# Patient Record
Sex: FEMALE | Race: WHITE | NOT HISPANIC OR LATINO | Employment: UNEMPLOYED
[De-identification: names, ages, dates, MRNs, and addresses within clinical notes are randomized per-mention and may not be internally consistent; named-entity substitution may affect disease eponyms.]

---

## 2019-11-05 ENCOUNTER — HEALTH MAINTENANCE LETTER (OUTPATIENT)
Age: 44
End: 2019-11-05

## 2020-11-22 ENCOUNTER — HEALTH MAINTENANCE LETTER (OUTPATIENT)
Age: 45
End: 2020-11-22

## 2021-09-19 ENCOUNTER — HEALTH MAINTENANCE LETTER (OUTPATIENT)
Age: 46
End: 2021-09-19

## 2022-01-09 ENCOUNTER — HEALTH MAINTENANCE LETTER (OUTPATIENT)
Age: 47
End: 2022-01-09

## 2022-05-23 ENCOUNTER — LAB (OUTPATIENT)
Dept: LAB | Facility: CLINIC | Age: 47
End: 2022-05-23

## 2022-05-23 ENCOUNTER — TELEPHONE (OUTPATIENT)
Dept: PEDIATRICS | Facility: CLINIC | Age: 47
End: 2022-05-23

## 2022-05-23 ENCOUNTER — OFFICE VISIT (OUTPATIENT)
Dept: PEDIATRICS | Facility: CLINIC | Age: 47
End: 2022-05-23
Payer: COMMERCIAL

## 2022-05-23 VITALS
SYSTOLIC BLOOD PRESSURE: 132 MMHG | TEMPERATURE: 98.2 F | OXYGEN SATURATION: 95 % | DIASTOLIC BLOOD PRESSURE: 72 MMHG | RESPIRATION RATE: 16 BRPM | BODY MASS INDEX: 38.32 KG/M2 | WEIGHT: 258.7 LBS | HEART RATE: 80 BPM | HEIGHT: 69 IN

## 2022-05-23 DIAGNOSIS — Z78.9 MALE-TO-FEMALE TRANSGENDER PERSON: Primary | ICD-10-CM

## 2022-05-23 DIAGNOSIS — Z12.11 COLON CANCER SCREENING: ICD-10-CM

## 2022-05-23 DIAGNOSIS — Z11.3 SCREEN FOR STD (SEXUALLY TRANSMITTED DISEASE): ICD-10-CM

## 2022-05-23 LAB
HCV AB SERPL QL IA: NONREACTIVE
HIV 1+2 AB+HIV1 P24 AG SERPL QL IA: NONREACTIVE
T PALLIDUM AB SER QL: NONREACTIVE

## 2022-05-23 PROCEDURE — 36415 COLL VENOUS BLD VENIPUNCTURE: CPT | Performed by: NURSE PRACTITIONER

## 2022-05-23 PROCEDURE — 99203 OFFICE O/P NEW LOW 30 MIN: CPT | Mod: 25 | Performed by: NURSE PRACTITIONER

## 2022-05-23 PROCEDURE — 90471 IMMUNIZATION ADMIN: CPT | Performed by: NURSE PRACTITIONER

## 2022-05-23 PROCEDURE — 90715 TDAP VACCINE 7 YRS/> IM: CPT | Performed by: NURSE PRACTITIONER

## 2022-05-23 PROCEDURE — 87389 HIV-1 AG W/HIV-1&-2 AB AG IA: CPT | Performed by: NURSE PRACTITIONER

## 2022-05-23 PROCEDURE — 86803 HEPATITIS C AB TEST: CPT | Performed by: NURSE PRACTITIONER

## 2022-05-23 PROCEDURE — 87491 CHLMYD TRACH DNA AMP PROBE: CPT | Mod: 59 | Performed by: NURSE PRACTITIONER

## 2022-05-23 PROCEDURE — 87491 CHLMYD TRACH DNA AMP PROBE: CPT

## 2022-05-23 PROCEDURE — 87591 N.GONORRHOEAE DNA AMP PROB: CPT

## 2022-05-23 PROCEDURE — 87591 N.GONORRHOEAE DNA AMP PROB: CPT | Mod: 59 | Performed by: NURSE PRACTITIONER

## 2022-05-23 PROCEDURE — 86780 TREPONEMA PALLIDUM: CPT | Performed by: NURSE PRACTITIONER

## 2022-05-23 ASSESSMENT — PAIN SCALES - GENERAL: PAINLEVEL: NO PAIN (0)

## 2022-05-23 NOTE — PROGRESS NOTES
HPI       Pt is a Male to Female individual that goes by Phoenix.  Pt is a 47 year old  that presents today to establish care with me. Came to this clinic via [facility].     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    to cis-woman Davy. Has transmasculine kid, came out in high school and is now age 23.  Is in polyamorous relationship. Davy's partner: serena.     Is not on hormones.    Patient came out on very recently. is out at work. They note their supports include wife and partners. Also came out to mother, an ex-nun and it went ok.     No formal history of depression or anxiety, no history of suicide attempt.   No flowsheet data found.  No flowsheet data found.    Goals of hormonal therapy include more femininity, bottom surgery.  Future goals of surgical intervention include unsure, bottom?  Future fertility plans include: likely not interested in genetic kids    is sexually active. Has primary partner wife, ciswoman, is in polyamourous relationships, doesn't use condoms 100%, bottoms with partners with penis. Currently feels safe in relationships.     History reviewed. No pertinent surgical history.    Patient Active Problem List   Diagnosis     Male-to-female transgender person       No current outpatient medications on file.       Family History   Problem Relation Age of Onset     Diabetes Mother      Cancer Father 76        brain     Diabetes Father           Allergies   Allergen Reactions     Zithromax [Azithromycin Dihydrate] Hives                Review of Systems:     Review of Systems:  CONSTITUTIONAL: NEGATIVE for fever, chills, change in weight  INTEGUMENTARY/SKIN: NEGATIVE for worrisome rashes, moles or lesions  EYES: NEGATIVE for vision changes or irritation  ENT/MOUTH: NEGATIVE for ear, mouth and throat problems  RESP: NEGATIVE for significant cough or SOB  BREAST: NEGATIVE for masses, tenderness or discharge  CV: NEGATIVE for chest pain, palpitations or peripheral edema  GI: NEGATIVE for  "nausea, abdominal pain, heartburn, or change in bowel habits  : NEGATIVE for frequency, dysuria, or hematuria  MUSCULOSKELETAL: NEGATIVE for significant arthralgias or myalgia  NEURO: NEGATIVE for weakness, dizziness or paresthesias  ENDOCRINE: NEGATIVE for temperature intolerance, skin/hair changes  HEME/ALLERGY: NEGATIVE for bleeding problems  PSYCHIATRIC: NEGATIVE for changes in mood or affect         Physical Exam:     Vitals:    05/23/22 0806   BP: 132/72   BP Location: Right arm   Cuff Size: Adult Large   Pulse: 80   Resp: 16   Temp: 98.2  F (36.8  C)   TempSrc: Tympanic   SpO2: 95%   Weight: 117.3 kg (258 lb 11.2 oz)   Height: 1.746 m (5' 8.75\")     BMI= Body mass index is 38.48 kg/m .       Affect: Appropriate/mood-congruent           Labs:     No results found for any previous visit.       No results found for: A1C  No results found for: LDL  No results found for: HGB]    Assessment and Plan      Dixon was seen today for establish care.    Diagnoses and all orders for this visit:    Male-to-female transgender person    Screen for STD (sexually transmitted disease)  -     HIV Antigen Antibody Combo; Future  -     Hepatitis C Screen Reflex to HCV RNA Quant and Genotype; Future  -     Cancel: NEISSERIA GONORRHOEA PCR  -     Cancel: CHLAMYDIA TRACHOMATIS PCR  -     NEISSERIA GONORRHOEA PCR  -     CHLAMYDIA TRACHOMATIS PCR  -     Treponema Abs w Reflex to RPR and Titer; Future  -     HIV Antigen Antibody Combo  -     Hepatitis C Screen Reflex to HCV RNA Quant and Genotype  -     Treponema Abs w Reflex to RPR and Titer  -     Chlamydia trachomatis PCR; Future  -     Neisseria gonorrhoeae PCR; Future    Colon cancer screening  -     COLOGUARD(EXACT SCIENCES)    Other orders  -     REVIEW OF HEALTH MAINTENANCE PROTOCOL ORDERS  -     TDAP VACCINE (Adacel, Boostrix)        Oriented to clinic, my schedule, clinic processes. Encourage use of mychart for lab results. Oriented to overall hormone start process, may be " months before hormones start, expectation to continue in therapy during first months to years of hormone start, need for [id number] visits then q3mo, then q6mo.    We did completed a thorough medical and social history review today, briefly mentioned gender affirming care, but did not do informed consent letter. She does need primary care including labs, cancer prevention discussion, prevent visit. Her priority is to start estrogen. Will schedule vrt visit as soon as feasible to discuss infomred consent document.       There are no discontinued medications.  Questions were elicited and answered.     Shannon Arguello, JOHANNA CNP

## 2022-05-23 NOTE — PATIENT INSTRUCTIONS
At next visit we'll talk about gender affirmation medications, surgeries, sleep, cholesterol and heart disease prevention, cancer screening.     Risks of Feminizing Hormones (MtF)     Increased risks:  - Venous thromboembolic disease: especially with age above 40, smokers, sedentary lifestyle, and/or thromboembolic disorders  - Cardiovascular and cerebrovascular disease: increases above age 50, may be increased with progestins  - Lipids: Increase triglycerides increasing risk of pancreatitis and cardiovascular events  - Liver: elevations of liver enzymes have been associated with estrogens, rarely hepatotoxicity. Estrogens may also increase the risk of gallstones  - Type 2 diabetes, hypertension, prolactinoma (tumor in the brain) may also be increased risk with hormone therapy.    Unclear risks:  -Fertility: recommended to store sperm prior to starting estrogen therapy to preserve fertilty  -Breast cancer: unclear risk of breast cancer    Sexual health: Hormone therapy may decrease libdo, impair fertility, and impact sexual function.

## 2022-05-23 NOTE — TELEPHONE ENCOUNTER
Lab called station and reported wrong swab was used for rectal sample. Need patient to return to redo sample.    Called and left VM that lab had a problem with sample. Need patient to return to clinic for lab appointment. Please get patient in asap for lab appointment. Ok to take same day.    Scarlett Bonilla, CMA

## 2022-05-23 NOTE — TELEPHONE ENCOUNTER
When the pt calls back we will schedule a lab appt as soon as possible.   Shobha Dozier on 5/23/2022 at 11:07 AM

## 2022-05-24 LAB
C TRACH DNA SPEC QL NAA+PROBE: NEGATIVE
N GONORRHOEA DNA SPEC QL NAA+PROBE: NEGATIVE

## 2022-05-24 NOTE — TELEPHONE ENCOUNTER
"Pt called back (called PAL).    Pt already left a new sample last night. Sample is currently in process.      - Ashvin \"Vishal\" Bethany (he/him/his), RN - Patient Advocate Liason (PAL)  [facility]    "

## 2022-05-25 LAB
C TRACH DNA SPEC QL NAA+PROBE: NEGATIVE
N GONORRHOEA DNA SPEC QL NAA+PROBE: NEGATIVE

## 2022-06-10 LAB — NONINV COLON CA DNA+OCC BLD SCRN STL QL: NEGATIVE

## 2022-06-15 NOTE — ASSESSMENT & PLAN NOTE
Reviewed estrogen and nasrin, will start prescription and do baseline labs today. follow up in 3 month for labs and will est with therapist

## 2022-06-15 NOTE — PROGRESS NOTES
Assessment/Plan  Problem List Items Addressed This Visit     Male-to-female transgender person - Primary     Reviewed estrogen and nasrin, will start prescription and do baseline labs today. follow up in 3 month for labs and will est with therapist           Relevant Medications    estradiol (ESTRACE) 2 MG tablet    spironolactone (ALDACTONE) 25 MG tablet    Other Relevant Orders    Testosterone, total    Comprehensive metabolic panel (BMP + Alb, Alk Phos, ALT, AST, Total. Bili, TP)    Lipid panel reflex to direct LDL Fasting    CBC with platelets and differential    Lipid panel reflex to direct LDL Fasting    Comprehensive metabolic panel (BMP + Alb, Alk Phos, ALT, AST, Total. Bili, TP)    Testosterone total          No results found for any visits on 06/17/22.    Health Maintenance Due   Topic Date Due     PREVENTIVE CARE VISIT  Never done     ADVANCE CARE PLANNING  Never done     LIPID  Never done     Connected via [employer]My[employer] visit  Time in: 8:20 am    Time out: 9 am    Subjective  Patient is ready to start a prescription for gender affirming hormones during this visit. Informed consent was not yet signed.       Review of Systems    History  No past medical history on file.    No past surgical history on file.    Family History   Problem Relation Age of Onset     Diabetes Mother      Cancer Father 76        brain     Diabetes Father        Social History     Tobacco Use     Smoking status: Never Smoker     Smokeless tobacco: Never Used   Substance Use Topics     Alcohol use: Yes     Comment: 1 beer every 2 weeks        Objective  There were no vitals taken for this visit.  Vitals taken by JOHANNA Espinoza CNP    This visit was a video visit.   Physical Exam   GENERAL: Healthy, alert and no distress  EYES: Eyes grossly normal to inspection.  No discharge or erythema, or obvious scleral/conjunctival abnormalities.  RESP: No audible wheeze, cough, or visible cyanosis.  No visible retractions or increased  work of breathing.    SKIN: Visible skin clear. No significant rash, abnormal pigmentation or lesions.  NEURO: Cranial nerves grossly intact.  Mentation and speech appropriate for age.  PSYCH: Mentation appears normal, affect normal/bright, judgement and insight intact, normal speech and appearance well-groomed.     Return in about 3 months (around 9/17/2022).        JOHANNA Espinoza CNP  [facility]

## 2022-06-17 ENCOUNTER — LAB (OUTPATIENT)
Dept: LAB | Facility: CLINIC | Age: 47
End: 2022-06-17

## 2022-06-17 ENCOUNTER — VIRTUAL VISIT (OUTPATIENT)
Dept: PEDIATRICS | Facility: CLINIC | Age: 47
End: 2022-06-17
Payer: COMMERCIAL

## 2022-06-17 ENCOUNTER — PATIENT OUTREACH (OUTPATIENT)
Dept: PEDIATRICS | Facility: CLINIC | Age: 47
End: 2022-06-17

## 2022-06-17 VITALS
WEIGHT: 256 LBS | BODY MASS INDEX: 38.08 KG/M2 | SYSTOLIC BLOOD PRESSURE: 122 MMHG | HEART RATE: 54 BPM | DIASTOLIC BLOOD PRESSURE: 78 MMHG

## 2022-06-17 DIAGNOSIS — Z78.9 MALE-TO-FEMALE TRANSGENDER PERSON: Primary | ICD-10-CM

## 2022-06-17 DIAGNOSIS — Z78.9 MALE-TO-FEMALE TRANSGENDER PERSON: ICD-10-CM

## 2022-06-17 LAB
ALBUMIN SERPL-MCNC: 4.1 G/DL (ref 3.4–5)
ALP SERPL-CCNC: 104 U/L (ref 40–150)
ALT SERPL W P-5'-P-CCNC: 29 U/L (ref 0–70)
ANION GAP SERPL CALCULATED.3IONS-SCNC: 4 MMOL/L (ref 3–14)
AST SERPL W P-5'-P-CCNC: 21 U/L (ref 0–45)
BASOPHILS # BLD AUTO: 0.1 10E3/UL (ref 0–0.2)
BASOPHILS NFR BLD AUTO: 1 %
BILIRUB SERPL-MCNC: 0.6 MG/DL (ref 0.2–1.3)
BUN SERPL-MCNC: 11 MG/DL (ref 7–30)
CALCIUM SERPL-MCNC: 8.9 MG/DL (ref 8.5–10.1)
CHLORIDE BLD-SCNC: 110 MMOL/L (ref 94–109)
CHOLEST SERPL-MCNC: 203 MG/DL
CO2 SERPL-SCNC: 26 MMOL/L (ref 20–32)
CREAT SERPL-MCNC: 0.79 MG/DL (ref 0.52–1.25)
EOSINOPHIL # BLD AUTO: 0.2 10E3/UL (ref 0–0.7)
EOSINOPHIL NFR BLD AUTO: 3 %
ERYTHROCYTE [DISTWIDTH] IN BLOOD BY AUTOMATED COUNT: 13.1 % (ref 10–15)
FASTING STATUS PATIENT QL REPORTED: YES
GFR SERPL CREATININE-BSD FRML MDRD: >90 ML/MIN/1.73M2
GLUCOSE BLD-MCNC: 100 MG/DL (ref 70–99)
HCT VFR BLD AUTO: 47.4 % (ref 35–53)
HDLC SERPL-MCNC: 39 MG/DL
HGB BLD-MCNC: 16 G/DL (ref 11.7–17.7)
LDLC SERPL CALC-MCNC: 139 MG/DL
LYMPHOCYTES # BLD AUTO: 2 10E3/UL (ref 0.8–5.3)
LYMPHOCYTES NFR BLD AUTO: 26 %
MCH RBC QN AUTO: 29.5 PG (ref 26.5–33)
MCHC RBC AUTO-ENTMCNC: 33.8 G/DL (ref 31.5–36.5)
MCV RBC AUTO: 88 FL (ref 78–100)
MONOCYTES # BLD AUTO: 0.9 10E3/UL (ref 0–1.3)
MONOCYTES NFR BLD AUTO: 11 %
NEUTROPHILS # BLD AUTO: 4.7 10E3/UL (ref 1.6–8.3)
NEUTROPHILS NFR BLD AUTO: 60 %
NONHDLC SERPL-MCNC: 164 MG/DL
PLATELET # BLD AUTO: 321 10E3/UL (ref 150–450)
POTASSIUM BLD-SCNC: 3.8 MMOL/L (ref 3.4–5.3)
PROT SERPL-MCNC: 7.3 G/DL (ref 6.8–8.8)
RBC # BLD AUTO: 5.42 10E6/UL (ref 3.8–5.9)
SODIUM SERPL-SCNC: 140 MMOL/L (ref 133–144)
TRIGL SERPL-MCNC: 123 MG/DL
WBC # BLD AUTO: 7.9 10E3/UL (ref 4–11)

## 2022-06-17 PROCEDURE — 80053 COMPREHEN METABOLIC PANEL: CPT

## 2022-06-17 PROCEDURE — 36415 COLL VENOUS BLD VENIPUNCTURE: CPT

## 2022-06-17 PROCEDURE — 85025 COMPLETE CBC W/AUTO DIFF WBC: CPT

## 2022-06-17 PROCEDURE — 84403 ASSAY OF TOTAL TESTOSTERONE: CPT

## 2022-06-17 PROCEDURE — 80061 LIPID PANEL: CPT

## 2022-06-17 PROCEDURE — 99214 OFFICE O/P EST MOD 30 MIN: CPT | Mod: 95 | Performed by: NURSE PRACTITIONER

## 2022-06-17 RX ORDER — SPIRONOLACTONE 25 MG/1
25 TABLET ORAL 2 TIMES DAILY
Qty: 180 TABLET | Refills: 3 | Status: SHIPPED | OUTPATIENT
Start: 2022-06-17 | End: 2023-03-10

## 2022-06-17 RX ORDER — ESTRADIOL 2 MG/1
2 TABLET ORAL 2 TIMES DAILY
Qty: 180 TABLET | Refills: 0 | Status: SHIPPED | OUTPATIENT
Start: 2022-06-17 | End: 2022-09-06

## 2022-06-17 NOTE — Clinical Note
Print an send informed consent letter to patient. Please keep an eye on getting a signed copy back. Once back, ping me to send in prescription for GAHT.  Schedule lab only appointment fasting as soon as feasible (today is fine). If she comes in today, can you please give her the informed consent letter for her to sign and leave with you? Schedule lab only fasting in 3 month AND vitals nurse only, then with me the following wk VRT ok.  ALSO help connect with gender queer therapist AND support group for family (if you know of any!).

## 2022-06-17 NOTE — PATIENT INSTRUCTIONS
Schedule lab only as soon as feasible.     We'll start spironolactone 25 mg TWICE daily. Along with estrace 2 mg TWICE daily.     Vishal will be in contact with you about establishing care with a therapist.     [email or web address]

## 2022-06-17 NOTE — TELEPHONE ENCOUNTER
"Shannon Laboy: Pt was seen in the clinic for labs and signed consents with me, along with collecting BP and wt.    Scheduled follow-up appts.    Also, introduced myself.      - Ashvin \"Vishal\" Bethany (he/him/his), RN - Patient Advocate Liason (PAL)  [facility]    "

## 2022-06-17 NOTE — LETTER
[facility]  [address] [place] [id number]  [phone]      June 17, 2022    Phoenix BRYANNA Parsonsceleste                                                                                                                                               [facility] [place] [id number]      Informed Consent for Feminizing Therapy    Your initials indicate an understanding and discussion regarding the feminizing effects of medications. If you have questions or concerns, please ask for clarification before initialing.   Androgen (testosterone) blockers are used to decrease the amount and/or block the effect of testosterone and reduce the male features of the body.   Estrogen (usually estradiol) is used to feminize the body. Estrogens can also decrease the amount and effect of testosterone. Your provider will determine the form of estrogen and the dose that is best for you based on your personal needs, considering your medical history. If you have any questions or concerns, please ask for clarification before initialing.     ____I have been informed that the feminizing effects of estrogen therapy may take up to several months to become noticeable and more than 5 years to become complete. Some changes may be permanent including breast growth and development, testicles shrinking in size, decreased sperm production and decreased libido.  Other changes include loss of muscle mass, weight gain, fat redistribution, softer skin, softening of facial and body hair, decreased strength of erections and changes in mood.    ____I understand that there is a potential loss of fertility.  Even after stopping hormone therapy the ability to make viable sperm may not come back.  I have been advised to undergo sperm banking if this is a concern of mine.    ____ I understand that if I have penetrative sex with a person with a vagina, I can potentially cause pregnancy and should consider birth control if not  the intent.    ____ I understand that there is an increased risk of developing blood clots. Symptoms of blood clots were reviewed with me today. I acknowledge that smoking and vaping as well as hypertension, high cholesterol and diabetes also increases clot risks and can inflate this risk when on estrogen hormones.     ____ I understand that the effects of estrogens may lead to liver inflammation and damage. I will have routine blood work to screen for these issues.     ____ I understand that the effects of estrogens can increase blood pressure and I will have routine care to monitor as indicated by the provider.     ____I understand the effects of estrogens can increase headaches and migraines.  If I have migraines with aura, this may significantly increase risk of stroke and these risks have been reviewed by the provider.    ____I understand that I may need to stop taking hormones a few weeks before and after any surgery.    ____I understand that if I should have an orchiectomy this may change the dosing of my medications and I will review this with my prescriber.    ____I agree to tell my provider if I should be taking any other medications, dietary supplements, herbs and/or recreational drugs.  The supplements can interfere with my estrogen therapy and increase risks.    ____I understand that every body is different and that there is no way to predict what my response to hormones will be.  I also understand that the right dosage for me may not be the same as for someone else.  I further understand that I will follow the prescribed regime of medications as indicated by my provider.     ____I will have complete physical exams and lab tests as indicated by my provider.  I understand this is a requirement to continue my therapy.      By signing this form I acknowledge that I have adequate information and knowledge to be able to make a decision about hormone therapy and that I understand the information my medical  provider has given me.      ________________________________________  Patient signature and date      ________________________________________  Provider signature and date

## 2022-06-17 NOTE — TELEPHONE ENCOUNTER
"Outreach to pt:  1) Print Informed Consent form for HRT. Await for signed form. Then route to PCP to sign appropriate medications.    2) Schedule a lab appt ASAP. Fasting.    3) Schedule a lab appt 3 months from now, followed by nurse-only appt for vitals, and VRT appt with Shannon.    4) Help pt find gender-queer therapist.    5) If able, recommend support group/resource for family.        - Ashvin \"Vishal\" Bethany (he/him/his), RN - Patient Advocate Liason (PAL)  [facility]    "

## 2022-06-20 ENCOUNTER — TELEPHONE (OUTPATIENT)
Dept: PEDIATRICS | Facility: CLINIC | Age: 47
End: 2022-06-20

## 2022-06-20 NOTE — TELEPHONE ENCOUNTER
Prior Authorization Retail Medication Request    Medication/Dose: estradiol (ESTRACE) 2 MG tablet  ICD code (if different than what is on RX):  Male-to-female transgender person [Z78.9]  - Primary   Previously Tried and Failed: NA  Rationale: NA    Insurance Name: [employer]  Insurance ID: [id number]     Pharmacy Information (if different than what is on RX)  Name: [place], [place]  Phone: [phone]

## 2022-06-21 LAB — TESTOST SERPL-MCNC: 282 NG/DL (ref 8–950)

## 2022-06-22 NOTE — TELEPHONE ENCOUNTER
Central Prior Authorization Team  Phone: [phone]      PA Initiation    Medication: Prior Authorization; estradiol (ESTRACE) 2 MG tablet-INITIATED   Insurance Company:    Pharmacy Filling the Rx: [facility] - [place], [place] - [address]  Filling Pharmacy Phone: [phone]  Filling Pharmacy Fax:    Start Date: 6/22/2022

## 2022-06-22 NOTE — TELEPHONE ENCOUNTER
Central Prior Authorization Team  Phone: [phone]    Prior Authorization Approval    Authorization Effective Date:    Authorization Expiration Date:    Medication: Prior Authorization; estradiol (ESTRACE) 2 MG tablet-APPROVED   Approved Dose/Quantity: 2MG/  Reference #:     Insurance Company:    Expected CoPay:       CoPay Card Available:      Foundation Assistance Needed:    Which Pharmacy is filling the prescription (Not needed for infusion/clinic administered): [facility] - [place], [place] - [address]  Pharmacy Notified:  YES  Patient Notified:  YES

## 2022-08-30 ENCOUNTER — ALLIED HEALTH/NURSE VISIT (OUTPATIENT)
Dept: PEDIATRICS | Facility: CLINIC | Age: 47
End: 2022-08-30

## 2022-08-30 ENCOUNTER — LAB (OUTPATIENT)
Dept: LAB | Facility: CLINIC | Age: 47
End: 2022-08-30
Payer: COMMERCIAL

## 2022-08-30 VITALS
HEART RATE: 56 BPM | WEIGHT: 237.9 LBS | TEMPERATURE: 96.8 F | BODY MASS INDEX: 35.39 KG/M2 | OXYGEN SATURATION: 98 % | SYSTOLIC BLOOD PRESSURE: 118 MMHG | DIASTOLIC BLOOD PRESSURE: 70 MMHG

## 2022-08-30 DIAGNOSIS — Z78.9 MALE-TO-FEMALE TRANSGENDER PERSON: ICD-10-CM

## 2022-08-30 DIAGNOSIS — Z01.30 BP CHECK: Primary | ICD-10-CM

## 2022-08-30 LAB
ALBUMIN SERPL-MCNC: 3.7 G/DL (ref 3.4–5)
ALP SERPL-CCNC: 82 U/L (ref 40–150)
ALT SERPL W P-5'-P-CCNC: 21 U/L (ref 0–70)
ANION GAP SERPL CALCULATED.3IONS-SCNC: 9 MMOL/L (ref 3–14)
AST SERPL W P-5'-P-CCNC: 15 U/L (ref 0–45)
BILIRUB SERPL-MCNC: 0.4 MG/DL (ref 0.2–1.3)
BUN SERPL-MCNC: 14 MG/DL (ref 7–30)
CALCIUM SERPL-MCNC: 9 MG/DL (ref 8.5–10.1)
CHLORIDE BLD-SCNC: 108 MMOL/L (ref 94–109)
CHOLEST SERPL-MCNC: 181 MG/DL
CO2 SERPL-SCNC: 20 MMOL/L (ref 20–32)
CREAT SERPL-MCNC: 0.76 MG/DL (ref 0.52–1.25)
FASTING STATUS PATIENT QL REPORTED: YES
GFR SERPL CREATININE-BSD FRML MDRD: >90 ML/MIN/1.73M2
GLUCOSE BLD-MCNC: 102 MG/DL (ref 70–99)
HDLC SERPL-MCNC: 38 MG/DL
LDLC SERPL CALC-MCNC: 108 MG/DL
NONHDLC SERPL-MCNC: 143 MG/DL
POTASSIUM BLD-SCNC: 4.2 MMOL/L (ref 3.4–5.3)
PROT SERPL-MCNC: 6.9 G/DL (ref 6.8–8.8)
SODIUM SERPL-SCNC: 137 MMOL/L (ref 133–144)
TRIGL SERPL-MCNC: 176 MG/DL

## 2022-08-30 PROCEDURE — 80061 LIPID PANEL: CPT

## 2022-08-30 PROCEDURE — 99207 PR NO CHARGE NURSE ONLY: CPT

## 2022-08-30 PROCEDURE — 80053 COMPREHEN METABOLIC PANEL: CPT

## 2022-08-30 PROCEDURE — 84403 ASSAY OF TOTAL TESTOSTERONE: CPT

## 2022-08-30 PROCEDURE — 36415 COLL VENOUS BLD VENIPUNCTURE: CPT

## 2022-08-30 SDOH — ECONOMIC STABILITY: TRANSPORTATION INSECURITY
IN THE PAST 12 MONTHS, HAS LACK OF TRANSPORTATION KEPT YOU FROM MEETINGS, WORK, OR FROM GETTING THINGS NEEDED FOR DAILY LIVING?: NO

## 2022-08-30 SDOH — HEALTH STABILITY: PHYSICAL HEALTH: ON AVERAGE, HOW MANY MINUTES DO YOU ENGAGE IN EXERCISE AT THIS LEVEL?: 120 MIN

## 2022-08-30 SDOH — ECONOMIC STABILITY: FOOD INSECURITY: WITHIN THE PAST 12 MONTHS, THE FOOD YOU BOUGHT JUST DIDN'T LAST AND YOU DIDN'T HAVE MONEY TO GET MORE.: NEVER TRUE

## 2022-08-30 SDOH — ECONOMIC STABILITY: FOOD INSECURITY: WITHIN THE PAST 12 MONTHS, YOU WORRIED THAT YOUR FOOD WOULD RUN OUT BEFORE YOU GOT MONEY TO BUY MORE.: NEVER TRUE

## 2022-08-30 SDOH — ECONOMIC STABILITY: INCOME INSECURITY: HOW HARD IS IT FOR YOU TO PAY FOR THE VERY BASICS LIKE FOOD, HOUSING, MEDICAL CARE, AND HEATING?: NOT VERY HARD

## 2022-08-30 SDOH — HEALTH STABILITY: PHYSICAL HEALTH: ON AVERAGE, HOW MANY DAYS PER WEEK DO YOU ENGAGE IN MODERATE TO STRENUOUS EXERCISE (LIKE A BRISK WALK)?: 7 DAYS

## 2022-08-30 SDOH — ECONOMIC STABILITY: INCOME INSECURITY: IN THE LAST 12 MONTHS, WAS THERE A TIME WHEN YOU WERE NOT ABLE TO PAY THE MORTGAGE OR RENT ON TIME?: NO

## 2022-08-30 SDOH — ECONOMIC STABILITY: TRANSPORTATION INSECURITY
IN THE PAST 12 MONTHS, HAS THE LACK OF TRANSPORTATION KEPT YOU FROM MEDICAL APPOINTMENTS OR FROM GETTING MEDICATIONS?: NO

## 2022-08-30 ASSESSMENT — LIFESTYLE VARIABLES
AUDIT-C TOTAL SCORE: 1
HOW OFTEN DO YOU HAVE SIX OR MORE DRINKS ON ONE OCCASION: NEVER
HOW OFTEN DO YOU HAVE A DRINK CONTAINING ALCOHOL: MONTHLY OR LESS
SKIP TO QUESTIONS 9-10: 1
HOW MANY STANDARD DRINKS CONTAINING ALCOHOL DO YOU HAVE ON A TYPICAL DAY: 1 OR 2

## 2022-08-30 ASSESSMENT — ENCOUNTER SYMPTOMS
PALPITATIONS: 0
CONSTIPATION: 0
WEAKNESS: 0
MYALGIAS: 0
PARESTHESIAS: 0
HEMATURIA: 0
DIZZINESS: 0
SORE THROAT: 0
FEVER: 0
BREAST MASS: 0
ABDOMINAL PAIN: 0
COUGH: 0
NERVOUS/ANXIOUS: 0
DIARRHEA: 0
ARTHRALGIAS: 0
HEMATOCHEZIA: 0
HEARTBURN: 0
FREQUENCY: 0
SHORTNESS OF BREATH: 0
NAUSEA: 0
HEADACHES: 0
JOINT SWELLING: 0
DYSURIA: 0
EYE PAIN: 0
CHILLS: 0

## 2022-08-30 ASSESSMENT — SOCIAL DETERMINANTS OF HEALTH (SDOH)
IN A TYPICAL WEEK, HOW MANY TIMES DO YOU TALK ON THE PHONE WITH FAMILY, FRIENDS, OR NEIGHBORS?: TWICE A WEEK
HOW OFTEN DO YOU ATTEND CHURCH OR RELIGIOUS SERVICES?: NEVER
DO YOU BELONG TO ANY CLUBS OR ORGANIZATIONS SUCH AS CHURCH GROUPS UNIONS, FRATERNAL OR ATHLETIC GROUPS, OR SCHOOL GROUPS?: YES
HOW OFTEN DO YOU GET TOGETHER WITH FRIENDS OR RELATIVES?: TWICE A WEEK

## 2022-08-30 NOTE — PROGRESS NOTES
Dixon Corrales is a 47 year old patient who comes in today for a Blood Pressure check.  Initial BP:  /70   Pulse 56   Temp 96.8  F (36  C) (Temporal)   Wt 107.9 kg (237 lb 14.4 oz)   SpO2 98%   BMI 35.39 kg/m         Disposition: provider notified while patient in the clinic      GEM Townsend

## 2022-09-02 LAB — TESTOST SERPL-MCNC: 38 NG/DL (ref 8–950)

## 2022-09-06 ENCOUNTER — OFFICE VISIT (OUTPATIENT)
Dept: PEDIATRICS | Facility: CLINIC | Age: 47
End: 2022-09-06
Payer: COMMERCIAL

## 2022-09-06 VITALS
HEART RATE: 57 BPM | RESPIRATION RATE: 18 BRPM | WEIGHT: 240.7 LBS | TEMPERATURE: 97.3 F | HEIGHT: 69 IN | BODY MASS INDEX: 35.65 KG/M2 | OXYGEN SATURATION: 99 %

## 2022-09-06 DIAGNOSIS — E66.09 CLASS 2 OBESITY DUE TO EXCESS CALORIES WITH BODY MASS INDEX (BMI) OF 35.0 TO 35.9 IN ADULT, UNSPECIFIED WHETHER SERIOUS COMORBIDITY PRESENT: ICD-10-CM

## 2022-09-06 DIAGNOSIS — Z00.00 ROUTINE GENERAL MEDICAL EXAMINATION AT A HEALTH CARE FACILITY: Primary | ICD-10-CM

## 2022-09-06 DIAGNOSIS — Z78.9 MALE-TO-FEMALE TRANSGENDER PERSON: ICD-10-CM

## 2022-09-06 DIAGNOSIS — E66.812 CLASS 2 OBESITY DUE TO EXCESS CALORIES WITH BODY MASS INDEX (BMI) OF 35.0 TO 35.9 IN ADULT, UNSPECIFIED WHETHER SERIOUS COMORBIDITY PRESENT: ICD-10-CM

## 2022-09-06 DIAGNOSIS — D22.9 ATYPICAL MOLE: ICD-10-CM

## 2022-09-06 PROCEDURE — 90686 IIV4 VACC NO PRSV 0.5 ML IM: CPT | Performed by: NURSE PRACTITIONER

## 2022-09-06 PROCEDURE — 99213 OFFICE O/P EST LOW 20 MIN: CPT | Mod: 25 | Performed by: NURSE PRACTITIONER

## 2022-09-06 PROCEDURE — 99396 PREV VISIT EST AGE 40-64: CPT | Mod: 25 | Performed by: NURSE PRACTITIONER

## 2022-09-06 PROCEDURE — 90471 IMMUNIZATION ADMIN: CPT | Performed by: NURSE PRACTITIONER

## 2022-09-06 RX ORDER — ESTRADIOL 2 MG/1
2 TABLET ORAL 2 TIMES DAILY
Qty: 180 TABLET | Refills: 0 | Status: SHIPPED | OUTPATIENT
Start: 2022-09-06 | End: 2022-12-09

## 2022-09-06 ASSESSMENT — ENCOUNTER SYMPTOMS
DIZZINESS: 0
FEVER: 0
DYSURIA: 0
PALPITATIONS: 0
HEADACHES: 0
ABDOMINAL PAIN: 0
WEAKNESS: 0
HEMATURIA: 0
ARTHRALGIAS: 0
COUGH: 0
NERVOUS/ANXIOUS: 0
SHORTNESS OF BREATH: 0
FREQUENCY: 0
MYALGIAS: 0
JOINT SWELLING: 0
NAUSEA: 0
CHILLS: 0
CONSTIPATION: 0
SORE THROAT: 0
DIARRHEA: 0
EYE PAIN: 0

## 2022-09-06 ASSESSMENT — PAIN SCALES - GENERAL: PAINLEVEL: NO PAIN (0)

## 2022-09-06 NOTE — Clinical Note
I referred for hair removal of face - I want to be sure this gets done, because the referal looked sketchy.... she also has some benign appearing moles of face that sh'ed like removed along with laser hair removal.

## 2022-09-06 NOTE — PROGRESS NOTES
SUBJECTIVE:   CC: PhoenixS Stremski is an 47 year old who presents for preventative health visit.       Patient has been advised of split billing requirements and indicates understanding: Yes  Healthy Habits:     Getting at least 3 servings of Calcium per day:  Yes    Bi-annual eye exam:  Yes    Dental care twice a year:  Yes    Sleep apnea or symptoms of sleep apnea:  None    Diet:  Regular (no restrictions)    Frequency of exercise:  6-7 days/week    Duration of exercise:  Greater than 60 minutes    Taking medications regularly:  Yes    Medication side effects:  None    PHQ-2 Total Score: 0    Has been walking more, exercising. Trying to eat healtier.     Wt Readings from Last 4 Encounters:   09/06/22 109.2 kg (240 lb 11.2 oz)   08/30/22 107.9 kg (237 lb 14.4 oz)   06/17/22 116.1 kg (256 lb)   05/23/22 117.3 kg (258 lb 11.2 oz)     KRYSTEN, started in June. taking regularly, notes feeling a little more emotional, sensitive top. No skipped or missed dose. No bad se or nausea.       Today's PHQ-2 Score:   PHQ-2 ( 1999 Pfizer) 8/30/2022   Q1: Little interest or pleasure in doing things 0   Q2: Feeling down, depressed or hopeless 0   PHQ-2 Score 0   Q1: Little interest or pleasure in doing things Not at all   Q2: Feeling down, depressed or hopeless Not at all   PHQ-2 Score 0       Abuse: Current or Past(Physical, Sexual or Emotional)- No  Do you feel safe in your environment? Yes    Have you ever done Advance Care Planning? (For example, a Health Directive, POLST, or a discussion with a medical provider or your loved ones about your wishes):     Social History     Tobacco Use     Smoking status: Never Smoker     Smokeless tobacco: Never Used   Substance Use Topics     Alcohol use: Yes     Comment: 1 beer every 2 weeks         Alcohol Use 8/30/2022   Prescreen: >3 drinks/day or >7 drinks/week? No       Last PSA: No results found for: PSA    Reviewed orders with patient. Reviewed health maintenance and updated orders  "accordingly - Yes  Lab work is in process    Reviewed and updated as needed this visit by clinical staff   Tobacco  Allergies    Med Hx  Surg Hx  Fam Hx  Soc Hx          Reviewed and updated as needed this visit by Provider                       Review of Systems   Constitutional: Negative for chills and fever.   HENT: Negative for congestion, ear pain, hearing loss and sore throat.    Eyes: Negative for pain and visual disturbance.   Respiratory: Negative for cough and shortness of breath.    Cardiovascular: Negative for chest pain and palpitations.   Gastrointestinal: Negative for abdominal pain, constipation, diarrhea and nausea.   Genitourinary: Negative for dysuria, frequency, genital sores, hematuria and urgency.   Musculoskeletal: Negative for arthralgias, joint swelling and myalgias.   Skin: Negative for rash.   Neurological: Negative for dizziness, weakness and headaches.   Psychiatric/Behavioral: The patient is not nervous/anxious.          OBJECTIVE:   Pulse 57   Temp 97.3  F (36.3  C) (Tympanic)   Resp 18   Ht 1.753 m (5' 9\")   Wt 109.2 kg (240 lb 11.2 oz)   SpO2 99%   BMI 35.55 kg/m      Physical Exam  GENERAL: healthy, alert and no distress  EYES: Eyes grossly normal to inspection, PERRL and conjunctivae and sclerae normal  HENT: ear canals and TM's normal, nose and mouth without ulcers or lesions  NECK: no adenopathy, no asymmetry, masses, or scars and thyroid normal to palpation  RESP: lungs clear to auscultation - no rales, rhonchi or wheezes  CV: regular rate and rhythm, normal S1 S2, no S3 or S4, no murmur, click or rub, no peripheral edema and peripheral pulses strong  MS: no gross musculoskeletal defects noted, no edema  PSYCH: mentation appears normal, affect normal/bright        ASSESSMENT/PLAN:   (Z00.00) Routine general medical examination at a health care facility  (primary encounter diagnosis)  Comment:   Plan:     (Z78.9) Male-to-female transgender person  Comment: started GAHT " "3 month ago, taking regularly, no problems or side effects. Has noticed some changes, will continue current dose for now. Also discussed voice training (look at youtube versus speech therapy referral), and hair removal. follow up in 3 month, LOLA Melchor. Reviewed labs.   Plan: estradiol (ESTRACE) 2 MG tablet, Testosterone         Free and Total, Comprehensive metabolic panel         (BMP + Alb, Alk Phos, ALT, AST, Total. Bili,         TP), Lipid panel reflex to direct LDL Fasting          (E66.09,  Z68.35) Class 2 obesity due to excess calories with body mass index (BMI) of 35.0 to 35.9 in adult, unspecified whether serious comorbidity present  Comment: has been working hard with diet and exercise. Encouraged to continue  Plan:     (D22.9) Atypical mole  Comment: elevated benign appearing moles of face that she nicks with razor. intersted in hair removal laser of face.   Plan: Comprehensive Gender Care Referral            COUNSELING:   Reviewed preventive health counseling, as reflected in patient instructions  Special attention given to:        Regular exercise       Healthy diet/nutrition       Safe sex practices/STD prevention    Estimated body mass index is 35.55 kg/m  as calculated from the following:    Height as of this encounter: 1.753 m (5' 9\").    Weight as of this encounter: 109.2 kg (240 lb 11.2 oz).     Weight management plan: Discussed healthy diet and exercise guidelines    She reports that she has never smoked. She has never used smokeless tobacco.      Counseling Resources:  ATP IV Guidelines  Pooled Cohorts Equation Calculator  FRAX Risk Assessment  ICSI Preventive Guidelines  Dietary Guidelines for Americans, 2010  USDA's MyPlate  ASA Prophylaxis  Lung CA Screening    Shannon Arguello, JOHANNA [facility]EN[facility]  "

## 2022-09-20 ENCOUNTER — ALLIED HEALTH/NURSE VISIT (OUTPATIENT)
Dept: PEDIATRICS | Facility: CLINIC | Age: 47
End: 2022-09-20
Payer: COMMERCIAL

## 2022-09-20 DIAGNOSIS — Z23 HIGH PRIORITY FOR 2019-NCOV VACCINE: Primary | ICD-10-CM

## 2022-09-20 PROCEDURE — 0134A COVID-19,PF,MODERNA BIVALENT: CPT

## 2022-09-20 PROCEDURE — 91313 COVID-19,PF,MODERNA BIVALENT: CPT

## 2022-12-02 ENCOUNTER — LAB (OUTPATIENT)
Dept: LAB | Facility: CLINIC | Age: 47
End: 2022-12-02
Payer: COMMERCIAL

## 2022-12-02 DIAGNOSIS — Z78.9 MALE-TO-FEMALE TRANSGENDER PERSON: ICD-10-CM

## 2022-12-02 LAB
CHOLEST SERPL-MCNC: 202 MG/DL
HDLC SERPL-MCNC: 47 MG/DL
LDLC SERPL CALC-MCNC: 131 MG/DL
NONHDLC SERPL-MCNC: 155 MG/DL
SHBG SERPL-SCNC: 38 NMOL/L (ref 11–135)
TRIGL SERPL-MCNC: 121 MG/DL

## 2022-12-02 PROCEDURE — 36415 COLL VENOUS BLD VENIPUNCTURE: CPT

## 2022-12-02 PROCEDURE — 84403 ASSAY OF TOTAL TESTOSTERONE: CPT

## 2022-12-02 PROCEDURE — 80061 LIPID PANEL: CPT

## 2022-12-02 PROCEDURE — 80053 COMPREHEN METABOLIC PANEL: CPT

## 2022-12-02 PROCEDURE — 84270 ASSAY OF SEX HORMONE GLOBUL: CPT

## 2022-12-03 LAB
ALBUMIN SERPL BCG-MCNC: 4.4 G/DL (ref 3.5–5.2)
ALP SERPL-CCNC: 71 U/L (ref 35–129)
ALT SERPL W P-5'-P-CCNC: 17 U/L (ref 10–50)
ANION GAP SERPL CALCULATED.3IONS-SCNC: 20 MMOL/L (ref 7–15)
AST SERPL W P-5'-P-CCNC: 25 U/L (ref 10–50)
BILIRUB SERPL-MCNC: 0.2 MG/DL
BUN SERPL-MCNC: 17.1 MG/DL (ref 6–20)
CALCIUM SERPL-MCNC: 9.3 MG/DL (ref 8.6–10)
CHLORIDE SERPL-SCNC: 109 MMOL/L (ref 98–107)
CREAT SERPL-MCNC: 0.83 MG/DL (ref 0.51–1.17)
DEPRECATED HCO3 PLAS-SCNC: 15 MMOL/L (ref 22–29)
GFR SERPL CREATININE-BSD FRML MDRD: >90 ML/MIN/1.73M2
GLUCOSE SERPL-MCNC: 80 MG/DL (ref 70–99)
POTASSIUM SERPL-SCNC: 4.3 MMOL/L (ref 3.4–5.3)
PROT SERPL-MCNC: 6.9 G/DL (ref 6.4–8.3)
SODIUM SERPL-SCNC: 144 MMOL/L (ref 136–145)

## 2022-12-06 LAB
TESTOST FREE SERPL-MCNC: 2.3 NG/DL
TESTOST SERPL-MCNC: 135 NG/DL (ref 8–950)

## 2022-12-09 ENCOUNTER — VIRTUAL VISIT (OUTPATIENT)
Dept: PEDIATRICS | Facility: CLINIC | Age: 47
End: 2022-12-09
Payer: COMMERCIAL

## 2022-12-09 DIAGNOSIS — Z78.9 MALE-TO-FEMALE TRANSGENDER PERSON: Primary | ICD-10-CM

## 2022-12-09 PROCEDURE — 99214 OFFICE O/P EST MOD 30 MIN: CPT | Mod: 95 | Performed by: NURSE PRACTITIONER

## 2022-12-09 RX ORDER — ESTRADIOL 2 MG/1
2 TABLET ORAL 2 TIMES DAILY
Qty: 180 TABLET | Refills: 0 | Status: SHIPPED | OUTPATIENT
Start: 2022-12-09 | End: 2023-03-10

## 2022-12-09 NOTE — PROGRESS NOTES
Phoenix is a 47 year old who is being evaluated via a billable video visit.      How would you like to obtain your AVS? MyChart  If the video visit is dropped, the invitation should be resent by:   Will anyone else be joining your video visit? No        Assessment & Plan     Male-to-female transgender person  Doing overall well, willk eep dose the same for now, follow-up in 3-6 month.   - estradiol (ESTRACE) 2 MG tablet; Take 1 tablet (2 mg) by mouth 2 times daily  - Lipid panel reflex to direct LDL Fasting; Future  - Comprehensive metabolic panel (BMP + Alb, Alk Phos, ALT, AST, Total. Bili, TP); Future  - Testosterone total; Future             Return in about 3 months (around 3/9/2023) for Follow up, Video visit.    Shannon Arguello, APRN CNP  [facility] [place] Subjective Phoenix is a 47 year old, presenting for the following health issues:  No chief complaint on file.      HPI     At last visit 3 month ago, noted started GAHT last June. Had lasbs last wk. Since then, she notes she has been doing well. She has noticed breast tenderness, improvement of overall mood.     Of note, she is walking 10K per day, 2 hrs on eliptical. She weighed herself this morn at 229#.     Wt Readings from Last 4 Encounters:   09/06/22 109.2 kg (240 lb 11.2 oz)   08/30/22 107.9 kg (237 lb 14.4 oz)   06/17/22 116.1 kg (256 lb)   05/23/22 117.3 kg (258 lb 11.2 oz)       Review of Systems   Constitutional, HEENT, cardiovascular, pulmonary, GI, , musculoskeletal, neuro, skin, endocrine and psych systems are negative, except as otherwise noted.      Objective         Vitals:  No vitals were obtained today due to virtual visit.    Physical Exam   GENERAL: Healthy, alert and no distress  EYES: Eyes grossly normal to inspection.  No discharge or erythema, or obvious scleral/conjunctival abnormalities.  RESP: No audible wheeze, cough, or visible cyanosis.  No visible retractions or increased work of breathing.    SKIN:  Visible skin clear. No significant rash, abnormal pigmentation or lesions.  NEURO: Cranial nerves grossly intact.  Mentation and speech appropriate for age.  PSYCH: Mentation appears normal, affect normal/bright, judgement and insight intact, normal speech and appearance well-groomed.            Video-Visit Details    Video Start Time: 1:50 PM    Type of service:  Video Visit    Video End Time:2:07 PM    Originating Location (pt. Location): Home        Distant Location (provider location):  On-site    Platform used for Video Visit: foodpanda / hellofood

## 2022-12-09 NOTE — PATIENT INSTRUCTIONS
Keep dose of estrace the same for now, but we could consider increasing 3-6 month.     Keep up the great work with exercise!

## 2022-12-09 NOTE — Clinical Note
Can you call derm and verify that she is still on the waiting list? It looks like she was added in sept, but never heard confirmation or etimate. Also, can you snoop around to other derms to see if there is better availability for hair loss? Thank you!

## 2023-03-03 ENCOUNTER — LAB (OUTPATIENT)
Dept: LAB | Facility: CLINIC | Age: 48
End: 2023-03-03
Payer: COMMERCIAL

## 2023-03-03 DIAGNOSIS — Z78.9 MALE-TO-FEMALE TRANSGENDER PERSON: ICD-10-CM

## 2023-03-03 LAB
ALBUMIN SERPL BCG-MCNC: 4.5 G/DL (ref 3.5–5.2)
ALP SERPL-CCNC: 63 U/L (ref 35–129)
ALT SERPL W P-5'-P-CCNC: 18 U/L (ref 10–50)
ANION GAP SERPL CALCULATED.3IONS-SCNC: 10 MMOL/L (ref 7–15)
AST SERPL W P-5'-P-CCNC: 20 U/L (ref 10–50)
BILIRUB SERPL-MCNC: 0.3 MG/DL
BUN SERPL-MCNC: 14.8 MG/DL (ref 6–20)
CALCIUM SERPL-MCNC: 9.5 MG/DL (ref 8.6–10)
CHLORIDE SERPL-SCNC: 105 MMOL/L (ref 98–107)
CHOLEST SERPL-MCNC: 217 MG/DL
CREAT SERPL-MCNC: 0.79 MG/DL (ref 0.51–1.17)
DEPRECATED HCO3 PLAS-SCNC: 23 MMOL/L (ref 22–29)
GFR SERPL CREATININE-BSD FRML MDRD: >90 ML/MIN/1.73M2
GLUCOSE SERPL-MCNC: 100 MG/DL (ref 70–99)
HDLC SERPL-MCNC: 48 MG/DL
LDLC SERPL CALC-MCNC: 131 MG/DL
NONHDLC SERPL-MCNC: 169 MG/DL
POTASSIUM SERPL-SCNC: 4.6 MMOL/L (ref 3.4–5.3)
PROT SERPL-MCNC: 7 G/DL (ref 6.4–8.3)
SODIUM SERPL-SCNC: 138 MMOL/L (ref 136–145)
TRIGL SERPL-MCNC: 191 MG/DL

## 2023-03-03 PROCEDURE — 80053 COMPREHEN METABOLIC PANEL: CPT

## 2023-03-03 PROCEDURE — 84403 ASSAY OF TOTAL TESTOSTERONE: CPT

## 2023-03-03 PROCEDURE — 36415 COLL VENOUS BLD VENIPUNCTURE: CPT

## 2023-03-03 PROCEDURE — 80061 LIPID PANEL: CPT

## 2023-03-07 LAB — TESTOST SERPL-MCNC: 184 NG/DL (ref 8–950)

## 2023-03-10 ENCOUNTER — VIRTUAL VISIT (OUTPATIENT)
Dept: PEDIATRICS | Facility: CLINIC | Age: 48
End: 2023-03-10
Payer: COMMERCIAL

## 2023-03-10 DIAGNOSIS — E66.09 CLASS 1 OBESITY DUE TO EXCESS CALORIES WITHOUT SERIOUS COMORBIDITY WITH BODY MASS INDEX (BMI) OF 32.0 TO 32.9 IN ADULT: ICD-10-CM

## 2023-03-10 DIAGNOSIS — Z78.9 MALE-TO-FEMALE TRANSGENDER PERSON: Primary | ICD-10-CM

## 2023-03-10 DIAGNOSIS — E66.811 CLASS 1 OBESITY DUE TO EXCESS CALORIES WITHOUT SERIOUS COMORBIDITY WITH BODY MASS INDEX (BMI) OF 32.0 TO 32.9 IN ADULT: ICD-10-CM

## 2023-03-10 PROCEDURE — 99214 OFFICE O/P EST MOD 30 MIN: CPT | Mod: VID | Performed by: NURSE PRACTITIONER

## 2023-03-10 RX ORDER — SPIRONOLACTONE 25 MG/1
TABLET ORAL
Qty: 270 TABLET | Refills: 1 | Status: SHIPPED | OUTPATIENT
Start: 2023-03-10 | End: 2023-06-16

## 2023-03-10 RX ORDER — ESTRADIOL 2 MG/1
TABLET ORAL
Qty: 270 TABLET | Refills: 1 | Status: SHIPPED | OUTPATIENT
Start: 2023-03-10 | End: 2023-06-16

## 2023-03-10 NOTE — Clinical Note
Postpone for a wk and reach out to see if she has had a legal name change and make the changes in epic chart,. Thanks!

## 2023-03-10 NOTE — PROGRESS NOTES
Phoenix is a 48 year old who is being evaluated via a billable video visit.      How would you like to obtain your AVS? MyChart  If the video visit is dropped, the invitation should be resent by:   Will anyone else be joining your video visit? No          Assessment & Plan     Male-to-female transgender person  Doing overall well, noting little change. We reviewed last labs. Will increase spoir and estrace today, repeat labs in 3 month, se reviewed.   - spironolactone (ALDACTONE) 25 MG tablet; Take TWO tabs in the morning PO and one tab before bed  - estradiol (ESTRACE) 2 MG tablet; Take TWO tabs PO morning and one tab before bed  - Testosterone total; Future  - Comprehensive metabolic panel (BMP + Alb, Alk Phos, ALT, AST, Total. Bili, TP); Future  - Lipid panel reflex to direct LDL Fasting; Future    Class 1 obesity due to excess calories without serious comorbidity with body mass index (BMI) of 32.0 to 32.9 in adult  Has been doing great job losing weight. Will continue to work on diet and exercise.        Return in about 3 months (around 6/10/2023) for Video visit, with myself.    Shannon Arguello, APRN CNP  [facility] [place] Subjective Phoenix is a 48 year old, presenting for the following health issues:  No chief complaint on file.      HPI     Last visit 3 month ago, noted doing well on GAHT (started June 2022), kept doses the same. Since then, notes subtle feminizing changes including top changes. She has hair removal laser in June.     No new partners without partner, no need for updated STI screening.     Has been losing weight through exercise and diet. Is now at 227.     Wt Readings from Last 4 Encounters:   09/06/22 109.2 kg (240 lb 11.2 oz)   08/30/22 107.9 kg (237 lb 14.4 oz)   06/17/22 116.1 kg (256 lb)   05/23/22 117.3 kg (258 lb 11.2 oz)       Review of Systems   Constitutional, HEENT, cardiovascular, pulmonary, GI, , musculoskeletal, neuro, skin, endocrine and psych  systems are negative, except as otherwise noted.      Objective           Vitals:  No vitals were obtained today due to virtual visit.    Physical Exam   GENERAL: Healthy, alert and no distress  EYES: Eyes grossly normal to inspection.  No discharge or erythema, or obvious scleral/conjunctival abnormalities.  RESP: No audible wheeze, cough, or visible cyanosis.  No visible retractions or increased work of breathing.    SKIN: Visible skin clear. No significant rash, abnormal pigmentation or lesions.  NEURO: Cranial nerves grossly intact.  Mentation and speech appropriate for age.  PSYCH: Mentation appears normal, affect normal/bright, judgement and insight intact, normal speech and appearance well-groomed.            Video-Visit Details    Type of service:  Video Visit     Originating Location (pt. Location): Home    Distant Location (provider location):  On-site  Platform used for Video Visit: Gatheredtable

## 2023-06-09 ENCOUNTER — LAB (OUTPATIENT)
Dept: LAB | Facility: CLINIC | Age: 48
End: 2023-06-09
Payer: COMMERCIAL

## 2023-06-09 DIAGNOSIS — Z78.9 MALE-TO-FEMALE TRANSGENDER PERSON: ICD-10-CM

## 2023-06-09 LAB
ALBUMIN SERPL BCG-MCNC: 4.6 G/DL (ref 3.5–5.2)
ALP SERPL-CCNC: 53 U/L (ref 35–129)
ALT SERPL W P-5'-P-CCNC: 16 U/L (ref 10–50)
ANION GAP SERPL CALCULATED.3IONS-SCNC: 10 MMOL/L (ref 7–15)
AST SERPL W P-5'-P-CCNC: 20 U/L (ref 10–50)
BILIRUB SERPL-MCNC: 0.4 MG/DL
BUN SERPL-MCNC: 16.5 MG/DL (ref 6–20)
CALCIUM SERPL-MCNC: 9.5 MG/DL (ref 8.6–10)
CHLORIDE SERPL-SCNC: 102 MMOL/L (ref 98–107)
CHOLEST SERPL-MCNC: 186 MG/DL
CREAT SERPL-MCNC: 0.85 MG/DL (ref 0.51–1.17)
DEPRECATED HCO3 PLAS-SCNC: 24 MMOL/L (ref 22–29)
GFR SERPL CREATININE-BSD FRML MDRD: 85 ML/MIN/1.73M2
GLUCOSE SERPL-MCNC: 107 MG/DL (ref 70–99)
HDLC SERPL-MCNC: 51 MG/DL
LDLC SERPL CALC-MCNC: 113 MG/DL
NONHDLC SERPL-MCNC: 135 MG/DL
POTASSIUM SERPL-SCNC: 4.1 MMOL/L (ref 3.4–5.3)
PROT SERPL-MCNC: 7.1 G/DL (ref 6.4–8.3)
SODIUM SERPL-SCNC: 136 MMOL/L (ref 136–145)
TRIGL SERPL-MCNC: 108 MG/DL

## 2023-06-09 PROCEDURE — 80061 LIPID PANEL: CPT

## 2023-06-09 PROCEDURE — 84403 ASSAY OF TOTAL TESTOSTERONE: CPT

## 2023-06-09 PROCEDURE — 80053 COMPREHEN METABOLIC PANEL: CPT

## 2023-06-09 PROCEDURE — 36415 COLL VENOUS BLD VENIPUNCTURE: CPT

## 2023-06-12 LAB — TESTOST SERPL-MCNC: 44 NG/DL (ref 8–950)

## 2023-06-16 ENCOUNTER — TELEPHONE (OUTPATIENT)
Dept: PEDIATRICS | Facility: CLINIC | Age: 48
End: 2023-06-16

## 2023-06-16 ENCOUNTER — VIRTUAL VISIT (OUTPATIENT)
Dept: PEDIATRICS | Facility: CLINIC | Age: 48
End: 2023-06-16
Payer: COMMERCIAL

## 2023-06-16 DIAGNOSIS — F64.9 GENDER DYSPHORIA: ICD-10-CM

## 2023-06-16 DIAGNOSIS — Z78.9 MALE-TO-FEMALE TRANSGENDER PERSON: Primary | ICD-10-CM

## 2023-06-16 DIAGNOSIS — E66.09 CLASS 2 OBESITY DUE TO EXCESS CALORIES WITH BODY MASS INDEX (BMI) OF 35.0 TO 35.9 IN ADULT, UNSPECIFIED WHETHER SERIOUS COMORBIDITY PRESENT: ICD-10-CM

## 2023-06-16 DIAGNOSIS — E66.812 CLASS 2 OBESITY DUE TO EXCESS CALORIES WITH BODY MASS INDEX (BMI) OF 35.0 TO 35.9 IN ADULT, UNSPECIFIED WHETHER SERIOUS COMORBIDITY PRESENT: ICD-10-CM

## 2023-06-16 PROCEDURE — 99214 OFFICE O/P EST MOD 30 MIN: CPT | Mod: VID | Performed by: NURSE PRACTITIONER

## 2023-06-16 RX ORDER — ESTRADIOL 2 MG/1
2 TABLET ORAL 2 TIMES DAILY
Qty: 360 TABLET | Refills: 1 | Status: SHIPPED | OUTPATIENT
Start: 2023-06-16 | End: 2023-08-10

## 2023-06-16 RX ORDER — SPIRONOLACTONE 25 MG/1
TABLET ORAL
Qty: 270 TABLET | Refills: 1 | Status: SHIPPED | OUTPATIENT
Start: 2023-06-16 | End: 2023-08-10

## 2023-06-16 NOTE — TELEPHONE ENCOUNTER
"Called the pt. No answer. LVMTCB. Left RN PAL ext mobility number ([phone]).    ------------------------------------------------------    Pt called back. Pt seeking numbers for referrals and was having a tough time finding them on [employer].    Provided the pt with referral numbers (and reviewed my own number). No further questions.      - Ashvin \"Vishal\" Bethany (he/him/his), RN - Patient Advocate Liason (PAL)  [facility]    "

## 2023-06-16 NOTE — PROGRESS NOTES
Phoenix is a 48 year old who is being evaluated via a billable video visit.      How would you like to obtain your AVS?   If the video visit is dropped, the invitation should be resent by:   Will anyone else be joining your video visit?         Assessment & Plan     Male-to-female transgender person  Will increase dose of estrace and keep nasrin the same, last labs showed suppressed T. Would like to explore vaginoplasty and compare doing vaginoplasty WITH orchiectomy versus orchiectomy first, THEN vaignoplasty. refrral placed to consult with surgery. Also interested in voice training, speech therapy referral placed. Also interested in facial hair removal, was scheduled with derm through gender clinic referral, howevera ppt has been rscheduled multiple times from provider and far out. Will place referral to derm consultants.   - estradiol (ESTRACE) 2 MG tablet; Take 1 tablet (2 mg) by mouth 2 times daily  - spironolactone (ALDACTONE) 25 MG tablet; Take TWO tabs in the morning PO and one tab before bed  - Comprehensive Gender Care Referral; Future  - Speech Therapy Referral; Future  - Adult Dermatology Referral; Future    Class 2 obesity due to excess calories with body mass index (BMI) of 35.0 to 35.9 in adult, unspecified whether serious comorbidity present  Has done great with weight loss through diet and exercise. Has been exercsising a lot (1.5 hrs per day, which is a decrease). encuoraged to cut this down and focus on diet and maintain weight. Could consider medications to help over time.              Shannon Arguello, APRN CNP  [facility] [place] Subjective Phoenix is a 48 year old, presenting for the following health issues:  No chief complaint on file.         View : No data to display.              HPI     At last visit 3 month ago, we increased nasrin and estrace. Since then, she notes she is feeling really good. Her total T came down significantly.     She has been workig on diet and  exercise (90 min per day on eliptical, walking 2-3 hrs per day), lipids have markedly improved, she notes she hasn't seen much weight reduction, her weight is 222.        Wt Readings from Last 4 Encounters:   09/06/22 109.2 kg (240 lb 11.2 oz)   08/30/22 107.9 kg (237 lb 14.4 oz)   06/17/22 116.1 kg (256 lb)   05/23/22 117.3 kg (258 lb 11.2 oz)       Review of Systems   Constitutional, HEENT, cardiovascular, pulmonary, gi and gu systems are negative, except as otherwise noted.      Objective           Vitals:  No vitals were obtained today due to virtual visit.    Physical Exam   GENERAL: Healthy, alert and no distress  EYES: Eyes grossly normal to inspection.  No discharge or erythema, or obvious scleral/conjunctival abnormalities.  RESP: No audible wheeze, cough, or visible cyanosis.  No visible retractions or increased work of breathing.    SKIN: Visible skin clear. No significant rash, abnormal pigmentation or lesions.  NEURO: Cranial nerves grossly intact.  Mentation and speech appropriate for age.  PSYCH: Mentation appears normal, affect normal/bright, judgement and insight intact, normal speech and appearance well-groomed.            Video-Visit Details    Type of service:  Video Visit     Originating Location (pt. Location): Home    Distant Location (provider location):  On-site  Platform used for Video Visit: Gabino

## 2023-06-16 NOTE — TELEPHONE ENCOUNTER
Reason for Call:  Other call back    Detailed comments: PATIENT DID NOT WRITE DOWN INFORMATION GIVEN DURING VIRTUAL VISIT AND WOULD LIKE A CALL BACK    Phone Number Patient can be reached at: Cell number on file:    Telephone Information:   Mobile [phone]       Best Time: ASAP    Can we leave a detailed message on this number? YES    Call taken on 6/16/2023 at 2:48 PM by Alisa Shah

## 2023-06-16 NOTE — PATIENT INSTRUCTIONS
Expect a call to schedule an appointment with the gender clinic to discuss advantages and disadvantages of orchiectomy versus orchiectomy AND vaginoplasty.     Expect a call to schedule with voice therapy, be mindful of insurance coverage (or not!).     Schedule a physical with me in December. We can do labs at that time.

## 2023-06-16 NOTE — TELEPHONE ENCOUNTER
Patient instructions done on this pat, not sure if she needs further ifo than that. Please triage what she needs. THanks!

## 2023-06-19 ENCOUNTER — TELEPHONE (OUTPATIENT)
Dept: PLASTIC SURGERY | Facility: CLINIC | Age: 48
End: 2023-06-19
Payer: COMMERCIAL

## 2023-06-19 NOTE — CONFIDENTIAL NOTE
[facility] :  Care Coordination Note     SITUATION   Phoenix M Stremski (she/her) is a 48 year old adult who is receiving support for:  Care Team  .    BACKGROUND     Pt is scheduled for a vaginoplasty consult with Giuliana Rosario on 09/01/23.     Pt also stated interest in orchiectomy. After some discussion, pt stated she thinks she wants it at the same time as vaginoplasty. She may want to revisit this at the consult.     ASSESSMENT     Surgery              [facility] Assessment  [facility] ([facility] Enrollment: Enrolled  Patient has a therapist: No  Letter of support #1: Requested  Letter of support #2: Requested  Surgery being considered: Yes  Vaginoplasty: Yes    Pt reports:   No nicotine or other gender affirming surgeries  HRT 1 year      PLAN          Nursing Interventions:       Follow-up plan:  1. Establish with  Providers        Sheryl Franco

## 2023-06-20 NOTE — TELEPHONE ENCOUNTER
FUTURE VISIT INFORMATION      FUTURE VISIT INFORMATION:    Date: 9/1/23    Time: 12:30pm    Location: [facility]  REFERRAL INFORMATION:    Reason for visit/diagnosis  vaginoplasty - would like to discuss to orchiectomy    RECORDS REQUESTED FROM:       No recs to collect

## 2023-06-23 PROBLEM — F64.9 GENDER DYSPHORIA: Status: ACTIVE | Noted: 2023-06-23

## 2023-08-10 ENCOUNTER — TELEPHONE (OUTPATIENT)
Dept: PEDIATRICS | Facility: CLINIC | Age: 48
End: 2023-08-10

## 2023-08-10 DIAGNOSIS — Z78.9 MALE-TO-FEMALE TRANSGENDER PERSON: ICD-10-CM

## 2023-08-10 DIAGNOSIS — F64.9 GENDER DYSPHORIA: ICD-10-CM

## 2023-08-10 RX ORDER — ESTRADIOL 2 MG/1
4 TABLET ORAL 2 TIMES DAILY
Qty: 360 TABLET | Refills: 1 | Status: SHIPPED | OUTPATIENT
Start: 2023-08-10 | End: 2024-01-15

## 2023-08-10 RX ORDER — SPIRONOLACTONE 25 MG/1
TABLET ORAL
Qty: 270 TABLET | Refills: 1 | Status: SHIPPED | OUTPATIENT
Start: 2023-08-10 | End: 2024-01-15

## 2023-08-10 NOTE — TELEPHONE ENCOUNTER
Received call from pt's pharmacy  Per pt she is taking     Estradiol 2mg tablet 2 tabs twice a day and  Spironolactone 25mg 2 tabs twice  a day    This is not how it is listed in the medlist    Routing to PAL to follow up    Xavier Jacobs RN on 8/10/2023 at 9:20 AM

## 2023-08-10 NOTE — TELEPHONE ENCOUNTER
"Called the pt. No answer.    Left detailed voicemail message. Left RN PAL ext mobility number ([phone]).        - Ashvin \"Vishal\" Bethany (he/him/his), RN - Patient Advocate Liason (PAL)  [facility]    "

## 2023-08-10 NOTE — TELEPHONE ENCOUNTER
"Called the pt. No answer. LVMTCB, left RN PAL ext mobility number ([phone]).    If she calls back: Review how they are currently taking estradiol and spironolactone. Last visit on 06/16/2023 medications signed as noted in note below. I don't see any recommended changes in a visit or communication encounter since then.    As listed in chart:  Estradiol 2 mg tablet BID.  Spironolactone 25 mg TWO tabs in the morning, and ONE tabs before bed.    Pt noted to pharmacy, taking:  Estradiol 2 mg tablet TWO tabs BID (twice as much as prescribed)  Spironolactone 25 mg TWO tabs BID (one extra tab as prescribe)      - Ashvin \"Vishal\" Bethany (he/him/his), RN - Patient Advocate Liason (PAL)  [facility]    "

## 2023-08-10 NOTE — TELEPHONE ENCOUNTER
"Pt called back. Reviewed medications.    Shannon Laboy: Pt states that they have been taking estradiol and spironolactone as noted below:    - Estradiol 2 mg tablet TWO tabs BID (twice as much as prescribed)  - Spironolactone 25 mg TWO tabs BID (one extra tab as prescribe).    States that this is what was discussed in last visit (increased meds), but notes from visit do not note or reflect this. Pt states, \"I swear this is how we discussed increasing both of them.\"    Pt denies side-effects or concerns at this time. Has been taking them this way for nearly two months.    Please advise.      - Ashvin \"Vishal\" Bethany (he/him/his), RN - Patient Advocate Liason (PAL)  [facility]    "

## 2023-08-10 NOTE — TELEPHONE ENCOUNTER
From last visit from me:  Will increase dose of estrace and keep nasrin the same, last labs showed suppressed T. Would like to explore vaginoplasty and compare doing vaginoplasty WITH orchiectomy versus orchiectomy first, THEN vaignoplasty. refrral placed to consult with surgery. Also interested in voice training, speech therapy referral placed. Also interested in facial hair removal, was scheduled with derm through gender clinic referral, howevera ppt has been rscheduled multiple times from provider and far out. Will place referral to derm consultants.      Please let pt know that it was intended for her to take TWO 2 mg morning and TWO 2 mg evening (she was taking two 2 mg morning and one 2 mg before bed in march), and keep nasrin the same at two 25 mg tabs morning and one 25 mg before bed. I would like him to decrease nasrin to that dose and I have sent refills to pharm.

## 2023-08-21 ENCOUNTER — TRANSFERRED RECORDS (OUTPATIENT)
Dept: HEALTH INFORMATION MANAGEMENT | Facility: CLINIC | Age: 48
End: 2023-08-21
Payer: COMMERCIAL

## 2023-08-29 ENCOUNTER — TELEPHONE (OUTPATIENT)
Dept: PLASTIC SURGERY | Facility: CLINIC | Age: 48
End: 2023-08-29
Payer: COMMERCIAL

## 2023-08-29 NOTE — CONFIDENTIAL NOTE
Writer called to reschedule appt per pt request. Pt stated she was laid off of her longtime job and is losing insurance on 9/1/23. Giuliana stated she can see pt virtually 8/30/23 at 11 am. Pt rescheduled for then.

## 2023-08-29 NOTE — TELEPHONE ENCOUNTER
FUTURE VISIT INFORMATION        FUTURE VISIT INFORMATION:  Date: 8/30/23  Time: 11:00am  [place] [facility] [place]  REFERRAL INFORMATION:  Reason for visit/diagnosis  vaginoplasty - would like to discuss to orchiectomy     RECORDS REQUESTED FROM:         No recs to collect

## 2023-08-29 NOTE — TELEPHONE ENCOUNTER
[facility]    Phone Message    May a detailed message be left on voicemail: yes     Reason for Call: Other: Pt stated they will be losing their insurance and is requesting a call back ASAP to discuss rescheduling an appt so it is before the insurance is cancelled.      Action Taken: Message routed to:  [facility]    Travel Screening: Not Applicable

## 2023-08-30 ENCOUNTER — VIRTUAL VISIT (OUTPATIENT)
Dept: PLASTIC SURGERY | Facility: CLINIC | Age: 48
End: 2023-08-30
Attending: NURSE PRACTITIONER
Payer: COMMERCIAL

## 2023-08-30 ENCOUNTER — PRE VISIT (OUTPATIENT)
Dept: PLASTIC SURGERY | Facility: CLINIC | Age: 48
End: 2023-08-30

## 2023-08-30 VITALS — HEIGHT: 69 IN | WEIGHT: 222 LBS | BODY MASS INDEX: 32.88 KG/M2

## 2023-08-30 DIAGNOSIS — Z78.9 MALE-TO-FEMALE TRANSGENDER PERSON: ICD-10-CM

## 2023-08-30 DIAGNOSIS — F64.9 GENDER DYSPHORIA: Primary | ICD-10-CM

## 2023-08-30 PROBLEM — E66.09 CLASS 2 OBESITY DUE TO EXCESS CALORIES WITH BODY MASS INDEX (BMI) OF 35.0 TO 35.9 IN ADULT, UNSPECIFIED WHETHER SERIOUS COMORBIDITY PRESENT: Status: RESOLVED | Noted: 2022-09-06 | Resolved: 2023-08-30

## 2023-08-30 PROBLEM — E66.812 CLASS 2 OBESITY DUE TO EXCESS CALORIES WITH BODY MASS INDEX (BMI) OF 35.0 TO 35.9 IN ADULT, UNSPECIFIED WHETHER SERIOUS COMORBIDITY PRESENT: Status: RESOLVED | Noted: 2022-09-06 | Resolved: 2023-08-30

## 2023-08-30 PROCEDURE — 99205 OFFICE O/P NEW HI 60 MIN: CPT | Mod: VID | Performed by: NURSE PRACTITIONER

## 2023-08-30 NOTE — PROGRESS NOTES
"Virtual Visit Details    Type of service:  Video Visit     Originating Location (pt. Location): Home    Distant Location (provider location):  On-site  Platform used for Video Visit: Gabino  11:10am start time of video  12:01pm end time for video        Name: Phoenix M Stremski     MRN: [id number]   YOB: 1975                 Chief Complaint:   Gender Dysphoria            History of Present Illness:   Phoenix is a 48 year old transgender female seen in consultation for gender dysphoria    Patient transitioned medically starting 2022  Preferred pronouns are: she/her/hers  The patient has been on exogenous hormones since: late July 2022. She feels amazing on estrogen and is no longer \"the angry joel I used to be.\"   In terms of an intimate relationship, the patient has a spouse named Adria who is supportive. They are polyamorous so there is a trans male partner as well.  In terms of fertility, the patient: has a trans son    (New)  The patient has not yet obtained letters of support from providers. She is meeting with a new therapist soon but would need a second provider for new insurance.    (Prior Surgery)  The patient has previously undergone no gender surgeries.     Long-term surgical goals for the patient include: vaginoplasty. She is 90% sure about minimal depth vaginoplasty     The patient is here today expressing interest in vaginoplasty. Leaning toward minimal depth.    The patient has not begun hair removal. She has her initial laser appointment scheduled today.    She was very recently fired after 20 years at her job. She will lose insurance but is in process of applying to get new insurance. If she chooses to do COBRA, she would need one LOS for [facility], but if she does get [employer] insurance, it would be [facility] so she would need tow LOS. She is working on getting new job doing video work.  She has lost a significant amount of weight since starting hormones and has been exercising " "regularly to maintain.          Past Medical History:   No past medical history on file.  Gender dysphoria         Past Surgical History:   No past surgical history on file.         Social History:     Social History     Tobacco Use    Smoking status: Never    Smokeless tobacco: Never   Substance Use Topics    Alcohol use: Yes     Comment: 1 beer every 2 weeks            Family History:     Family History   Problem Relation Age of Onset    Diabetes Mother     Cancer Father 76        brain    Diabetes Father               Allergies:     Allergies   Allergen Reactions    Zithromax [Azithromycin Dihydrate] Hives            Medications:     Current Outpatient Medications   Medication Sig    estradiol (ESTRACE) 2 MG tablet Take 2 tablets (4 mg) by mouth 2 times daily    spironolactone (ALDACTONE) 25 MG tablet Take TWO tabs in the morning PO and one tab before bed     No current facility-administered medications for this visit.             Review of Systems:    ROS: ROS neg other than the symptoms noted above in the HPI.          Physical Exam:   Ht 1.753 m (5' 9\")   Wt 100.7 kg (222 lb)   BMI 32.78 kg/m    General: age-appropriate in NAD  HEENT: Head AT/NC,   Resp: no respiratory distress  :  exam deferred  Neuro: grossly intact  Motor: excellent strength throughout        Outside records:    I spent 10 minutes reviewing outside records.         Assessment and Plan:   48 year old transgender female with gender dysphoria    The criteria for genital surgery are specific to the type of surgery being requested.  Criteria for bottom surgery:    1. Persistent, well documented gender dysphoria;  2. Capacity to make a fully informed decision and to consent for treatment;  3. Age of consent (>18 years old)  4. If significant medical or mental health concerns are present, they must be well controlled.  5. 12 continuous months of hormone therapy as appropriate to the patient s gender goals (unless  the patient has a medical " contraindication or is otherwise unable or unwilling to take  Hormones).  6. Two letters of support    The aim of hormone therapy prior to gonadectomy is primarily to introduce a period of reversible  estrogen or testosterone suppression, before the patient undergoes irreversible surgical intervention.    I reviewed the steps of orchiectomy. I reviewed the surgical procedure. I reviewed the risks and benefits including bleeding, infection and irreversible nature of the procedure. The patient would like orchiectomy as part of vaginoplasty.    Hair removal is a requirement prior to full depth vaginoplasty as the genital skin will be placed in the vaginal cavity. Lack of hair removal would lead to complications related to intravaginal hair. This is nearly impossible to remove postoperatively.    She has a persistent, well documented gender dysphoria. She has capacity to make a fully informed decision and to consent for treatment. Her mental health issues are well controlled. She has been on continuous hormones for years. She will work on obtaining 2 letters of support.     The patient meets all of these criteria. We discussed that gender affirmation surgery should be considered permanent. We discussed risks/complications of rectal injury, rectovaginal fistula, bleeding, fluid collection, infection, injury to surrounding structures, flap loss, sensory loss, wound dehiscence, vaginal prolapse, vaginal shrinkage/stenosis, need for lifelong dilation, urinary stream abnormalities, DVT/PE and need for revision surgery.     We discussed the option for minimal depth and full depth. She would like minimal depth vaginoplasty (90% confident but will continue to think while working on LOS)     We also discussed the need to stop hormones teresa-procedurally for 1 week before and after surgery.     We discussed that transgender vaginoplasty for this patient would include: penectomy, orchiectomy, clitoroplasty, labiaplasty, urethral  reconstruction, creation of a vagina, skin graft, colpopexy to suspend the vagina, and scrotectomy.       Does not need hair removal for minimal depth  Not ready for prior auth      Plan: Patient to work on obtaining two LOS and RTC for physical exam with Dr Dacosta. Would then be ready for PA if letters reviewed and adequate.    F/U: Pranav to contact patient to provide MH resources for second LOS, schedule in person exam with Dr Dacosta for physical exam.    JOHANNA Allen, CNP  [facility]    60 minutes spent on day of encounter doing chart review, history and exam, consultation and education, and additional activities as including in note above.

## 2023-08-30 NOTE — LETTER
"8/30/2023       RE: Phoenix M Stremski  [address] [facility] [address]     Dear Colleague,    Thank you for referring your patient, Phoenix M Stremski, to the [facility] PLASTIC AND RECONSTRUCTIVE SURGERY CLINIC [place] at [facility]. Please see a copy of my visit note below.    Virtual Visit Details    Type of service:  Video Visit     Originating Location (pt. Location): Home    Distant Location (provider location):  On-site  Platform used for Video Visit: AmWell  11:10am start time of video  12:01pm end time for video        Name: Phoenix M Stremski     MRN: [id number]   YOB: 1975                 Chief Complaint:   Gender Dysphoria            History of Present Illness:   Phoenix is a 48 year old transgender female seen in consultation for gender dysphoria    Patient transitioned medically starting 2022  Preferred pronouns are: she/her/hers  The patient has been on exogenous hormones since: late July 2022. She feels amazing on estrogen and is no longer \"the angry joel I used to be.\"   In terms of an intimate relationship, the patient has a spouse named Adria who is supportive. They are polyamorous so there is a trans male partner as well.  In terms of fertility, the patient: has a trans son    (New)  The patient has not yet obtained letters of support from providers. She is meeting with a new therapist soon but would need a second provider for new insurance.    (Prior Surgery)  The patient has previously undergone no gender surgeries.     Long-term surgical goals for the patient include: vaginoplasty. She is 90% sure about minimal depth vaginoplasty     The patient is here today expressing interest in vaginoplasty. Leaning toward minimal depth.    The patient has not begun hair removal. She has her initial laser appointment scheduled today.    She was very recently fired after 20 years at her job. She will lose insurance but " "is in process of applying to get new insurance. If she chooses to do COBRA, she would need one LOS for [facility], but if she does get [employer] insurance, it would be [facility] so she would need tow LOS. She is working on getting new job doing video work.  She has lost a significant amount of weight since starting hormones and has been exercising regularly to maintain.          Past Medical History:   No past medical history on file.  Gender dysphoria         Past Surgical History:   No past surgical history on file.         Social History:     Social History     Tobacco Use    Smoking status: Never    Smokeless tobacco: Never   Substance Use Topics    Alcohol use: Yes     Comment: 1 beer every 2 weeks            Family History:     Family History   Problem Relation Age of Onset    Diabetes Mother     Cancer Father 76        brain    Diabetes Father               Allergies:     Allergies   Allergen Reactions    Zithromax [Azithromycin Dihydrate] Hives            Medications:     Current Outpatient Medications   Medication Sig    estradiol (ESTRACE) 2 MG tablet Take 2 tablets (4 mg) by mouth 2 times daily    spironolactone (ALDACTONE) 25 MG tablet Take TWO tabs in the morning PO and one tab before bed     No current facility-administered medications for this visit.             Review of Systems:    ROS: ROS neg other than the symptoms noted above in the HPI.          Physical Exam:   Ht 1.753 m (5' 9\")   Wt 100.7 kg (222 lb)   BMI 32.78 kg/m    General: age-appropriate in NAD  HEENT: Head AT/NC,   Resp: no respiratory distress  :  exam deferred  Neuro: grossly intact  Motor: excellent strength throughout        Outside records:    I spent 10 minutes reviewing outside records.         Assessment and Plan:   48 year old transgender female with gender dysphoria    The criteria for genital surgery are specific to the type of surgery being requested.  Criteria for bottom surgery:    1. Persistent, well documented " gender dysphoria;  2. Capacity to make a fully informed decision and to consent for treatment;  3. Age of consent (>18 years old)  4. If significant medical or mental health concerns are present, they must be well controlled.  5. 12 continuous months of hormone therapy as appropriate to the patient s gender goals (unless  the patient has a medical contraindication or is otherwise unable or unwilling to take  Hormones).  6. Two letters of support    The aim of hormone therapy prior to gonadectomy is primarily to introduce a period of reversible  estrogen or testosterone suppression, before the patient undergoes irreversible surgical intervention.    I reviewed the steps of orchiectomy. I reviewed the surgical procedure. I reviewed the risks and benefits including bleeding, infection and irreversible nature of the procedure. The patient would like orchiectomy as part of vaginoplasty.    Hair removal is a requirement prior to full depth vaginoplasty as the genital skin will be placed in the vaginal cavity. Lack of hair removal would lead to complications related to intravaginal hair. This is nearly impossible to remove postoperatively.    She has a persistent, well documented gender dysphoria. She has capacity to make a fully informed decision and to consent for treatment. Her mental health issues are well controlled. She has been on continuous hormones for years. She will work on obtaining 2 letters of support.     The patient meets all of these criteria. We discussed that gender affirmation surgery should be considered permanent. We discussed risks/complications of rectal injury, rectovaginal fistula, bleeding, fluid collection, infection, injury to surrounding structures, flap loss, sensory loss, wound dehiscence, vaginal prolapse, vaginal shrinkage/stenosis, need for lifelong dilation, urinary stream abnormalities, DVT/PE and need for revision surgery.     We discussed the option for minimal depth and full depth.  She would like minimal depth vaginoplasty (90% confident but will continue to think while working on LOS)     We also discussed the need to stop hormones teresa-procedurally for 1 week before and after surgery.     We discussed that transgender vaginoplasty for this patient would include: penectomy, orchiectomy, clitoroplasty, labiaplasty, urethral reconstruction, creation of a vagina, skin graft, colpopexy to suspend the vagina, and scrotectomy.       Does not need hair removal for minimal depth  Not ready for prior auth      Plan: Patient to work on obtaining two LOS and RTC for physical exam with Dr Dacosta. Would then be ready for PA if letters reviewed and adequate.    F/U: Pranav to contact patient to provide MH resources for second LOS, schedule in person exam with Dr Dacosta for physical exam.        60 minutes spent on day of encounter doing chart review, history and exam, consultation and education, and additional activities as including in note above.          Again, thank you for allowing me to participate in the care of your patient.      Sincerely,    JOHANNA Rodriguez CNP

## 2023-09-01 ENCOUNTER — PRE VISIT (OUTPATIENT)
Dept: PLASTIC SURGERY | Facility: CLINIC | Age: 48
End: 2023-09-01

## 2023-09-13 ENCOUNTER — TELEPHONE (OUTPATIENT)
Dept: DERMATOLOGY | Facility: CLINIC | Age: 48
End: 2023-09-13
Payer: COMMERCIAL

## 2023-09-13 NOTE — TELEPHONE ENCOUNTER
Pt called to be informed that she is next to come off of our [facility] wait list, no answer. Left message for her to return our call at [phone]. Please schedule consult.    Lavern Haddad RN on 9/13/2023 at 11:32 AM

## 2023-09-15 ENCOUNTER — TRANSFERRED RECORDS (OUTPATIENT)
Dept: HEALTH INFORMATION MANAGEMENT | Facility: CLINIC | Age: 48
End: 2023-09-15
Payer: COMMERCIAL

## 2023-09-20 NOTE — TELEPHONE ENCOUNTER
I called and spoke to Phoenix. She is pursuing treatment elsewhere and is requesting to be removed from the list.     Jessie ASHTON

## 2023-10-19 NOTE — NURSING NOTE
Is the patient currently in the state of [place]? YES    Visit mode:VIDEO    If the visit is dropped, the patient can be reconnected by: VIDEO VISIT: Send to e-mail at: [email or web address]    Will anyone else be joining the visit? NO  (If patient encounters technical issues they should call [phone]    How would you like to obtain your AVS? MyChart    Are changes needed to the allergy or medication list? No, Pt stated no changes to allergies, and Pt stated no med changes    Reason for visit: Consult    Sagrario NARVAEZ         NEPHROLOGY PROGRESS NOTE    REASON FOR CONSULT: ESRD on HD MWF    SUBJECTIVE:    Patient seen in the morning when he was eating his breakfast.  Continues to have head and neck pain.  He had dialysis yesterday but his blood pressure was low during dialysis.  Fluid removal was a challenge.  He was given his antihypertensives in the morning.    OBJECTIVE:    Visit Vitals  /74   Pulse 94   Temp 37.1 °C (98.8 °F)   Resp 20          Intake/Output Summary (Last 24 hours) at 10/19/2023 1344  Last data filed at 10/19/2023 0000  Gross per 24 hour   Intake 1900 ml   Output 650 ml   Net 1250 ml          General: Awake and Alert, In no distress, Cooperative  HEENT: Oral mucosa moist, EOMI, ecchymosis around the left eye and bruise over the forehead  NECK: Supple, right IJ TDC noted  CHEST: No crackles, no wheeze, no tachypnea  CVS: S1,S2 heard, no rubs, RRR  ABD: Soft, Non Tender, BS present  EXT: no edema    MEDICATION:    Scheduled medications  amLODIPine, 10 mg, oral, Daily  atorvastatin, 40 mg, oral, Nightly  calcium acetate, 1,334 mg, oral, TID  carvedilol, 25 mg, oral, BID with meals  [Held by provider] clopidogrel, 75 mg, oral, Daily  [Held by provider] enoxaparin, 30 mg, subcutaneous, Daily  gabapentin, 100 mg, oral, Nightly  guaiFENesin, 600 mg, oral, BID  heparin, 2,000 Units, intravenous, After Dialysis  heparin, 2,000 Units, intra-catheter, After Dialysis  heparin, 2,000 Units, intra-catheter, After Dialysis  hydrALAZINE, 100 mg, oral, BID  minoxidil, 10 mg, oral, BID  mirtazapine, 7.5 mg, oral, Nightly      Continuous medications     PRN medications  PRN medications: hydrALAZINE, hydrOXYzine HCL, ipratropium-albuteroL, ondansetron, sodium chloride, traMADol     RESULTS:    Lab Results   Component Value Date    WBC 7.4 10/19/2023    HGB 10.4 (L) 10/19/2023    HCT 32.2 (L) 10/19/2023    MCV 91 10/19/2023     (L) 10/19/2023        Lab Results   Component Value Date    CREATININE 8.64 (H) 10/18/2023    BUN  48 (H) 10/18/2023     (L) 10/18/2023    K 4.9 10/18/2023    CL 99 10/18/2023    CO2 26 10/18/2023        Radiology Imaging reviewed      ASSESSMENT/PLAN:     1.  ESRD on HD MWF: Patient to undergo dialysis tomorrow.  Underwent dialysis yesterday but his blood pressure kept dropping.  Fluid removal was a challenge.  He appears euvolemic.  Electrolytes are stable. We will dialyze him for 3-1/2 hours with a 2K bath with 2.5 L UF.    2.  Hyperphosphatemia: He is on calcium acetate with meals.    3.  Hypertension: He is on amlodipine Coreg,  minoxidil and hydralazine      Thank You very much for allowing me to participate in the care of this Patient    This document was created using dragon dictation and may contain unintended error    Deni Hamm MD   10/19/23

## 2023-11-14 ENCOUNTER — DOCUMENTATION ONLY (OUTPATIENT)
Dept: PLASTIC SURGERY | Facility: CLINIC | Age: 48
End: 2023-11-14
Payer: COMMERCIAL

## 2023-11-14 NOTE — PROGRESS NOTES
[facility] :  Care Coordination Note     SITUATION   Phoenix M Stremski (she/her) is a 48 year old adult who is receiving support for:  Care Team  .    BACKGROUND     Two LOS received but only one is needed for insurance LOS (located in media dated 11/12/23 PSLOS_Signed) meets criteria.    If pt changes insurance to [facility] per consult note, pt will need second LOS to be updated. Sent message to pt with need for updated second LOS if insurance does change.    3/13/24  Updated second LOS received (faxed to HIM from email dated 3/11) and meets criteria. First LOS (located in media tab dated 11/12/23 named PSLOS) was reviewed and also meets criteria. Sent message to RNCC requesting PA for minimal depth vaginoplasty.  ASSESSMENT     Surgery              [facility] Assessment  [facility] ([facility]) Enrollment: Enrolled  Patient has a therapist: Yes  Letter of support #1: Received  Letter #1 Date: 10/24/23  Letter of support #2: Received  Letter #2 Date: 03/05/24  Surgery being considered: Yes  Vaginoplasty: Yes      PLAN          Nursing Interventions:       Follow-up plan:  Surgeon will place BUDDY Patel

## 2023-11-21 ENCOUNTER — TELEPHONE (OUTPATIENT)
Dept: PEDIATRICS | Facility: CLINIC | Age: 48
End: 2023-11-21
Payer: COMMERCIAL

## 2023-11-21 NOTE — TELEPHONE ENCOUNTER
"1st / 2nd Attempt Patient Quality Outreach Health Maintenance - PAL RN    Summary:    PAL RN attempted (attempt # [phone]) to contact pt regarding overdue health maintenance    Patient is due/failing the following:   Physical Preventive Adult Physical  Has upcoming visit on 01/15/2024.      Topic Date Due    Hepatitis B Vaccine (1 of 3 - 3-dose series) Never done    Hepatitis A Vaccine (1 of 2 - Risk 2-dose series) Never done    COVID-19 Vaccine (4 - 2023-24 season) 09/01/2023       Health Maintenance Due   Topic Date Due    ADVANCE CARE PLANNING  Never done    HEPATITIS B IMMUNIZATION (1 of 3 - 3-dose series) Never done    HEPATITIS A IMMUNIZATION (1 of 2 - Risk 2-dose series) Never done    COVID-19 Vaccine (4 - 2023-24 season) 09/01/2023    YEARLY PREVENTIVE VISIT  09/06/2023       Type of outreach:    Chart review performed, no outreach needed.  Has upcoming visit on 01/15/2024.  - PAL RN Left VM requesting call back to further discuss and schedule appt, awaiting call back at this time.  - PAL RN sent Eventful message, advised to schedule appointment with provider    Next Steps:  Max number of attempts reached:  n/a . Will try again in 90 days if patient still on fail list.    Reach out within 90 days via Eventful.    Questions for provider review:    None                                                                                     Chart routed to n/a.      - Ashvin \"Vishal\" Bethany (he/him/his), RN - Patient Advocate Liason (PAL)  [facility]      "

## 2023-11-25 ENCOUNTER — HEALTH MAINTENANCE LETTER (OUTPATIENT)
Age: 48
End: 2023-11-25

## 2023-12-14 ENCOUNTER — DOCUMENTATION ONLY (OUTPATIENT)
Dept: PLASTIC SURGERY | Facility: CLINIC | Age: 48
End: 2023-12-14
Payer: COMMERCIAL

## 2023-12-14 NOTE — PROGRESS NOTES
[facility] :  Care Coordination Note     SITUATION   Phoenix M Stremski is a 48 year old adult who is receiving support for:  No chief complaint on file.  .    BACKGROUND     LOS Reviewed. They both meet criteria for minimal depth vaginoplasty. Writer sent message to RNCC to request PA from Dr. Dacosta.     ASSESSMENT     Surgery              [facility] Assessment  [facility] Care ([facility]) Enrollment: Enrolled  Patient has a therapist: Yes  Letter of support #1: Received  Letter #1 Date: 10/24/23  Letter of support #2: Received  Letter #2 Date: 12/08/23  Surgery being considered: Yes  Vaginoplasty: Yes      PLAN          Nursing Interventions:       Follow-up plan:  PA to be requested, minimal depth vaginoplasty to be scheduled.        MAEGAN STONE LPCC

## 2024-01-08 ASSESSMENT — ENCOUNTER SYMPTOMS
FREQUENCY: 0
ABDOMINAL PAIN: 0
SORE THROAT: 0
CHILLS: 0
HEMATURIA: 0
NERVOUS/ANXIOUS: 0
DIZZINESS: 0
JOINT SWELLING: 0
BREAST MASS: 0
COUGH: 0
CONSTIPATION: 0
HEMATOCHEZIA: 0
HEARTBURN: 0
ARTHRALGIAS: 0
PALPITATIONS: 0
FEVER: 0
WEAKNESS: 0
HEADACHES: 0
PARESTHESIAS: 0
DYSURIA: 0
DIARRHEA: 0
MYALGIAS: 0
EYE PAIN: 0
SHORTNESS OF BREATH: 0
NAUSEA: 0

## 2024-01-09 ENCOUNTER — OFFICE VISIT (OUTPATIENT)
Dept: PLASTIC SURGERY | Facility: CLINIC | Age: 49
End: 2024-01-09
Payer: COMMERCIAL

## 2024-01-09 VITALS
BODY MASS INDEX: 36.6 KG/M2 | DIASTOLIC BLOOD PRESSURE: 76 MMHG | OXYGEN SATURATION: 98 % | HEIGHT: 69 IN | SYSTOLIC BLOOD PRESSURE: 113 MMHG | WEIGHT: 247.1 LBS | HEART RATE: 65 BPM

## 2024-01-09 DIAGNOSIS — F64.9 GENDER DYSPHORIA: Primary | ICD-10-CM

## 2024-01-09 PROCEDURE — 99215 OFFICE O/P EST HI 40 MIN: CPT | Performed by: UROLOGY

## 2024-01-09 RX ORDER — ESCITALOPRAM OXALATE 20 MG/1
20 TABLET ORAL DAILY
COMMUNITY
Start: 2023-12-04

## 2024-01-09 ASSESSMENT — PAIN SCALES - GENERAL: PAINLEVEL: NO PAIN (0)

## 2024-01-09 NOTE — LETTER
"1/9/2024       RE: Phoenix M Stremski  [address] [facility] [address]     Dear Colleague,    Thank you for referring your patient, Phoenix M Stremski, to the [facility] PLASTIC AND RECONSTRUCTIVE SURGERY CLINIC [place] at [facility]. Please see a copy of my visit note below.        Name: Phoenix M Stremski     MRN: [id number]   YOB: 1975                 Chief Complaint:   Gender Dysphoria            History of Present Illness:   Phoenix is a 48 year old transgender female seen in consultation for gender dysphoria. Here for final consultation and exam with Dr. Dacosta having already been seen in clinic.     History from previous record is below:    Patient transitioned medically starting 2022  Preferred pronouns are: she/her/hers  The patient has been on exogenous hormones since: late July 2022. She feels amazing on estrogen and is no longer \"the angry joel I used to be.\"   In terms of an intimate relationship, the patient has a spouse named Adria who is supportive. They are polyamorous so there is a trans male partner as well.  In terms of fertility, the patient: has a trans son  The patient has previously undergone no gender surgeries.          Past Medical History:   No past medical history on file.  Gender dysphoria         Past Surgical History:   No past surgical history on file.         Social History:     Social History     Tobacco Use    Smoking status: Never    Smokeless tobacco: Never   Substance Use Topics    Alcohol use: Yes     Comment: 1 beer every 2 weeks            Family History:     Family History   Problem Relation Age of Onset    Diabetes Mother     Cancer Father 76        brain    Diabetes Father               Allergies:     Allergies   Allergen Reactions    Zithromax [Azithromycin Dihydrate] Hives            Medications:     Current Outpatient Medications   Medication Sig    escitalopram (LEXAPRO) 10 MG " "tablet     estradiol (ESTRACE) 2 MG tablet Take 2 tablets (4 mg) by mouth 2 times daily    spironolactone (ALDACTONE) 25 MG tablet Take TWO tabs in the morning PO and one tab before bed     No current facility-administered medications for this visit.             Review of Systems:    ROS: ROS neg other than the symptoms noted above in the HPI.          Physical Exam:   /76 (BP Location: Left arm, Patient Position: Sitting, Cuff Size: Adult Large)   Pulse 65   Ht 1.753 m (5' 9\")   Wt 112.1 kg (247 lb 1.6 oz)   SpO2 98%   BMI 36.49 kg/m    General: age-appropriate in NAD  HEENT: Head AT/NC,   Resp: no respiratory distress  : circumcised phallus, scrotum with testicles in place  Neuro: grossly intact  Motor: excellent strength throughout        Outside records:    I spent 10 minutes reviewing outside records.         Assessment and Plan:   48 year old transgender female with gender dysphoria    The criteria for genital surgery are specific to the type of surgery being requested.  Criteria for bottom surgery:    1. Persistent, well documented gender dysphoria;  2. Capacity to make a fully informed decision and to consent for treatment;  3. Age of consent (>18 years old)  4. If significant medical or mental health concerns are present, they must be well controlled.  5. 12 continuous months of hormone therapy as appropriate to the patient s gender goals (unless  the patient has a medical contraindication or is otherwise unable or unwilling to take  Hormones).  6. Two letters of support    The aim of hormone therapy prior to gonadectomy is primarily to introduce a period of reversible  estrogen or testosterone suppression, before the patient undergoes irreversible surgical intervention.    I reviewed the steps of orchiectomy. I reviewed the surgical procedure. I reviewed the risks and benefits including bleeding, infection and irreversible nature of the procedure. The patient would like orchiectomy as part of " vaginoplasty.    She has a persistent, well documented gender dysphoria. She has capacity to make a fully informed decision and to consent for treatment. Her mental health issues are well controlled. She has been on continuous hormones for years. She will work on obtaining 2 letters of support.     The patient meets all of these criteria. We discussed that gender affirmation surgery should be considered permanent. We discussed risks/complications of rectal injury, rectovaginal fistula, bleeding, fluid collection, infection, injury to surrounding structures, flap loss, sensory loss, wound dehiscence, vaginal prolapse, vaginal shrinkage/stenosis, need for lifelong dilation, urinary stream abnormalities, DVT/PE and need for revision surgery.     We discussed the option for minimal depth and full depth. She would like minimal depth vaginoplasty (90% confident but will continue to think while working on LOS)     We also discussed the need to stop hormones teresa-procedurally for 1 week before and after surgery.     We discussed that transgender vaginoplasty for this patient would include: penectomy, orchiectomy, clitoroplasty, labiaplasty, urethral reconstruction and scrotectomy.       Plan:   Minimal depth vaginoplasty  Ready for PA if letters reviewed and adequate.      Lucien Aguilar  Urology Fellow    Physician Attestation   I, Viraj Dacosta MD, saw this patient and agree with the findings and plan of care as documented in the note.          45 minutes spent by me on the date of the encounter doing chart review, history and exam, documentation and further activities per the note              Again, thank you for allowing me to participate in the care of your patient.      Sincerely,    Viraj Dacosta MD

## 2024-01-09 NOTE — PROGRESS NOTES
"    Name: Phoenix M Stremski     MRN: [id number]   YOB: 1975                 Chief Complaint:   Gender Dysphoria            History of Present Illness:   Phoenix is a 48 year old transgender female seen in consultation for gender dysphoria. Here for final consultation and exam with Dr. Dacosta having already been seen in clinic.     History from previous record is below:    Patient transitioned medically starting 2022  Preferred pronouns are: she/her/hers  The patient has been on exogenous hormones since: late July 2022. She feels amazing on estrogen and is no longer \"the angry joel I used to be.\"   In terms of an intimate relationship, the patient has a spouse named Adria who is supportive. They are polyamorous so there is a trans male partner as well.  In terms of fertility, the patient: has a trans son  The patient has previously undergone no gender surgeries.          Past Medical History:   No past medical history on file.  Gender dysphoria         Past Surgical History:   No past surgical history on file.         Social History:     Social History     Tobacco Use    Smoking status: Never    Smokeless tobacco: Never   Substance Use Topics    Alcohol use: Yes     Comment: 1 beer every 2 weeks            Family History:     Family History   Problem Relation Age of Onset    Diabetes Mother     Cancer Father 76        brain    Diabetes Father               Allergies:     Allergies   Allergen Reactions    Zithromax [Azithromycin Dihydrate] Hives            Medications:     Current Outpatient Medications   Medication Sig    escitalopram (LEXAPRO) 10 MG tablet     estradiol (ESTRACE) 2 MG tablet Take 2 tablets (4 mg) by mouth 2 times daily    spironolactone (ALDACTONE) 25 MG tablet Take TWO tabs in the morning PO and one tab before bed     No current facility-administered medications for this visit.             Review of Systems:    ROS: ROS neg other than the symptoms noted above in the HPI.          " "Physical Exam:   /76 (BP Location: Left arm, Patient Position: Sitting, Cuff Size: Adult Large)   Pulse 65   Ht 1.753 m (5' 9\")   Wt 112.1 kg (247 lb 1.6 oz)   SpO2 98%   BMI 36.49 kg/m    General: age-appropriate in NAD  HEENT: Head AT/NC,   Resp: no respiratory distress  : circumcised phallus, scrotum with testicles in place  Neuro: grossly intact  Motor: excellent strength throughout        Outside records:    I spent 10 minutes reviewing outside records.         Assessment and Plan:   48 year old transgender female with gender dysphoria    The criteria for genital surgery are specific to the type of surgery being requested.  Criteria for bottom surgery:    1. Persistent, well documented gender dysphoria;  2. Capacity to make a fully informed decision and to consent for treatment;  3. Age of consent (>18 years old)  4. If significant medical or mental health concerns are present, they must be well controlled.  5. 12 continuous months of hormone therapy as appropriate to the patient s gender goals (unless  the patient has a medical contraindication or is otherwise unable or unwilling to take  Hormones).  6. Two letters of support    The aim of hormone therapy prior to gonadectomy is primarily to introduce a period of reversible  estrogen or testosterone suppression, before the patient undergoes irreversible surgical intervention.    I reviewed the steps of orchiectomy. I reviewed the surgical procedure. I reviewed the risks and benefits including bleeding, infection and irreversible nature of the procedure. The patient would like orchiectomy as part of vaginoplasty.    She has a persistent, well documented gender dysphoria. She has capacity to make a fully informed decision and to consent for treatment. Her mental health issues are well controlled. She has been on continuous hormones for years. She will work on obtaining 2 letters of support.     The patient meets all of these criteria. We discussed " that gender affirmation surgery should be considered permanent. We discussed risks/complications of rectal injury, rectovaginal fistula, bleeding, fluid collection, infection, injury to surrounding structures, flap loss, sensory loss, wound dehiscence, vaginal prolapse, vaginal shrinkage/stenosis, need for lifelong dilation, urinary stream abnormalities, DVT/PE and need for revision surgery.     We discussed the option for minimal depth and full depth. She would like minimal depth vaginoplasty (90% confident but will continue to think while working on LOS)     We also discussed the need to stop hormones teresa-procedurally for 1 week before and after surgery.     We discussed that transgender vaginoplasty for this patient would include: penectomy, orchiectomy, clitoroplasty, labiaplasty, urethral reconstruction and scrotectomy.       Plan:   Minimal depth vaginoplasty  Ready for PA if letters reviewed and adequate.      Lucien Aguilar  Urology Fellow    Physician Attestation   I, Viraj Dacosta MD, saw this patient and agree with the findings and plan of care as documented in the note.      Viraj Dacosta MD  Reconstructive Urology    45 minutes spent by me on the date of the encounter doing chart review, history and exam, documentation and further activities per the note

## 2024-01-15 ENCOUNTER — OFFICE VISIT (OUTPATIENT)
Dept: PEDIATRICS | Facility: CLINIC | Age: 49
End: 2024-01-15
Attending: NURSE PRACTITIONER
Payer: COMMERCIAL

## 2024-01-15 VITALS
TEMPERATURE: 97.5 F | HEIGHT: 69 IN | BODY MASS INDEX: 36.17 KG/M2 | DIASTOLIC BLOOD PRESSURE: 66 MMHG | SYSTOLIC BLOOD PRESSURE: 106 MMHG | OXYGEN SATURATION: 98 % | WEIGHT: 244.2 LBS | HEART RATE: 76 BPM | RESPIRATION RATE: 18 BRPM

## 2024-01-15 DIAGNOSIS — F64.9 GENDER DYSPHORIA: ICD-10-CM

## 2024-01-15 DIAGNOSIS — Z78.9 MALE-TO-FEMALE TRANSGENDER PERSON: ICD-10-CM

## 2024-01-15 DIAGNOSIS — M25.571 PAIN IN JOINT INVOLVING ANKLE AND FOOT, RIGHT: ICD-10-CM

## 2024-01-15 DIAGNOSIS — F33.41 RECURRENT MAJOR DEPRESSIVE DISORDER, IN PARTIAL REMISSION (H): ICD-10-CM

## 2024-01-15 DIAGNOSIS — Z00.00 ROUTINE GENERAL MEDICAL EXAMINATION AT A HEALTH CARE FACILITY: Primary | ICD-10-CM

## 2024-01-15 LAB
ALBUMIN SERPL BCG-MCNC: 4.4 G/DL (ref 3.5–5.2)
ALP SERPL-CCNC: 53 U/L (ref 40–150)
ALT SERPL W P-5'-P-CCNC: 21 U/L (ref 0–70)
ANION GAP SERPL CALCULATED.3IONS-SCNC: 11 MMOL/L (ref 7–15)
AST SERPL W P-5'-P-CCNC: 17 U/L (ref 0–45)
BILIRUB SERPL-MCNC: 0.3 MG/DL
BUN SERPL-MCNC: 18.1 MG/DL (ref 6–20)
CALCIUM SERPL-MCNC: 9.4 MG/DL (ref 8.6–10)
CHLORIDE SERPL-SCNC: 101 MMOL/L (ref 98–107)
CREAT SERPL-MCNC: 0.88 MG/DL (ref 0.51–1.17)
DEPRECATED HCO3 PLAS-SCNC: 25 MMOL/L (ref 22–29)
EGFRCR SERPLBLD CKD-EPI 2021: 82 ML/MIN/1.73M2
GLUCOSE SERPL-MCNC: 86 MG/DL (ref 70–99)
POTASSIUM SERPL-SCNC: 4.7 MMOL/L (ref 3.4–5.3)
PROT SERPL-MCNC: 7 G/DL (ref 6.4–8.3)
SODIUM SERPL-SCNC: 137 MMOL/L (ref 135–145)

## 2024-01-15 PROCEDURE — 90471 IMMUNIZATION ADMIN: CPT | Performed by: NURSE PRACTITIONER

## 2024-01-15 PROCEDURE — 90636 HEP A/HEP B VACC ADULT IM: CPT | Performed by: NURSE PRACTITIONER

## 2024-01-15 PROCEDURE — 36415 COLL VENOUS BLD VENIPUNCTURE: CPT | Performed by: NURSE PRACTITIONER

## 2024-01-15 PROCEDURE — 99214 OFFICE O/P EST MOD 30 MIN: CPT | Mod: 25 | Performed by: NURSE PRACTITIONER

## 2024-01-15 PROCEDURE — 84403 ASSAY OF TOTAL TESTOSTERONE: CPT | Performed by: NURSE PRACTITIONER

## 2024-01-15 PROCEDURE — 99396 PREV VISIT EST AGE 40-64: CPT | Mod: 25 | Performed by: NURSE PRACTITIONER

## 2024-01-15 PROCEDURE — 80053 COMPREHEN METABOLIC PANEL: CPT | Performed by: NURSE PRACTITIONER

## 2024-01-15 RX ORDER — SPIRONOLACTONE 25 MG/1
TABLET ORAL
Qty: 270 TABLET | Refills: 1 | Status: SHIPPED | OUTPATIENT
Start: 2024-01-15 | End: 2024-08-09

## 2024-01-15 RX ORDER — ESTRADIOL 2 MG/1
4 TABLET ORAL 2 TIMES DAILY
Qty: 360 TABLET | Refills: 1 | Status: SHIPPED | OUTPATIENT
Start: 2024-01-15 | End: 2024-06-25

## 2024-01-15 ASSESSMENT — ENCOUNTER SYMPTOMS
EYE PAIN: 0
DIZZINESS: 0
JOINT SWELLING: 0
ABDOMINAL PAIN: 0
DYSURIA: 0
PALPITATIONS: 0
DIARRHEA: 0
ARTHRALGIAS: 0
FREQUENCY: 0
MYALGIAS: 0
WEAKNESS: 0
CHILLS: 0
HEMATURIA: 0
NAUSEA: 0
NERVOUS/ANXIOUS: 0
FEVER: 0
CONSTIPATION: 0
COUGH: 0
HEADACHES: 0
SORE THROAT: 0
SHORTNESS OF BREATH: 0

## 2024-01-15 ASSESSMENT — PAIN SCALES - GENERAL: PAINLEVEL: NO PAIN (0)

## 2024-01-15 NOTE — PROGRESS NOTES
"  Assessment & Plan     Routine general medical examination at a health care facility      Gender dysphoria  Last labs reviewed, will keep dose the same, excited about bottom surgery, will refer for FFS  - PRIMARY CARE FOLLOW-UP SCHEDULING  - estradiol (ESTRACE) 2 MG tablet; Take 2 tablets (4 mg) by mouth 2 times daily  - spironolactone (ALDACTONE) 25 MG tablet; Take TWO tabs in the morning PO and one tab before bed  - Testosterone total; Future  - Comprehensive metabolic panel (BMP + Alb, Alk Phos, ALT, AST, Total. Bili, TP); Future  - Comprehensive Gender Care Referral; Future  - Testosterone total  - Comprehensive metabolic panel (BMP + Alb, Alk Phos, ALT, AST, Total. Bili, TP)    Male-to-female transgender person    - estradiol (ESTRACE) 2 MG tablet; Take 2 tablets (4 mg) by mouth 2 times daily  - spironolactone (ALDACTONE) 25 MG tablet; Take TWO tabs in the morning PO and one tab before bed    Recurrent major depressive disorder, in partial remission (H24)  Stable, despite losing job. Will make no changes     Pain in joint involving ankle and foot, right  Will refer to ortho  - Orthopedic  Referral; Future             BMI:   Estimated body mass index is 36.59 kg/m  as calculated from the following:    Height as of this encounter: 1.74 m (5' 8.5\").    Weight as of this encounter: 110.8 kg (244 lb 3.2 oz).   Weight management plan: Discussed healthy diet and exercise guidelines        Shannon Arguello, JOHANNA CNP  [facility] KENYA Pembertonenix is a 48 year old, presenting for the following health issues:  Physical        1/15/2024     8:18 AM   Additional Questions   Roomed by Scarlett Bonilla CMA       Healthy Habits:     Getting at least 3 servings of Calcium per day:  Yes    Bi-annual eye exam:  Yes    Dental care twice a year:  Yes    Sleep apnea or symptoms of sleep apnea:  None    Diet:  Regular (no restrictions)    Frequency of exercise:  6-7 days/week    Duration of " "exercise:  Greater than 60 minutes    Taking medications regularly:  Yes    Medication side effects:  None    FASTING today     Lost job last fall, triggered mental health.     History of transwoman, started GAHT July 2022. Met with bottom surgeon last wk, discussed plan of care, submitted PA to insurance. At last visit last June, we increased estrace and kept nasrin the the same.     Went to psychiatry, [facility], is on lexapro 10 mg, started last dec notes small improvement in mood. Saw therapists for letters for gender surgery. Did couples therapy.     Ankle R pain x years, hurts worse with being at rest, then getting up.     Review of Systems   Constitutional:  Negative for chills and fever.   HENT:  Negative for congestion, ear pain, hearing loss and sore throat.    Eyes:  Negative for pain and visual disturbance.   Respiratory:  Negative for cough and shortness of breath.    Cardiovascular:  Negative for chest pain and palpitations.   Gastrointestinal:  Negative for abdominal pain, constipation, diarrhea and nausea.   Genitourinary:  Negative for dysuria, frequency, genital sores, hematuria and urgency.   Musculoskeletal:  Negative for arthralgias, joint swelling and myalgias.   Skin:  Negative for rash.   Neurological:  Negative for dizziness, weakness and headaches.   Psychiatric/Behavioral:  The patient is not nervous/anxious.           Objective    /66 (BP Location: Right arm, Cuff Size: Adult Large)   Pulse 76   Temp 97.5  F (36.4  C) (Tympanic)   Resp 18   Ht 1.74 m (5' 8.5\")   Wt 110.8 kg (244 lb 3.2 oz)   SpO2 98%   BMI 36.59 kg/m    Body mass index is 36.59 kg/m .  Physical Exam   GENERAL: healthy, alert and no distress  EYES: Eyes grossly normal to inspection, PERRL and conjunctivae and sclerae normal  HENT: ear canals and TM's normal, nose and mouth without ulcers or lesions  NECK: no adenopathy, no asymmetry, masses, or scars and thyroid normal to palpation  RESP: lungs clear to " auscultation - no rales, rhonchi or wheezes  CV: regular rate and rhythm, normal S1 S2, no S3 or S4, no murmur, click or rub, no peripheral edema and peripheral pulses strong  MS: no gross musculoskeletal defects noted, no edema                  Answers submitted by the patient for this visit:  Annual Preventive Visit (Submitted on 1/8/2024)  Chief Complaint: Annual Exam:  Blood in stool: No  heartburn: No  peripheral edema: No  mood changes: No  Skin sensation changes: No  pelvic pain: No  vaginal bleeding: No  vaginal discharge: No  tenderness: No  breast mass: No  breast discharge: No  impotence: No  penile discharge: No

## 2024-01-17 LAB — TESTOST SERPL-MCNC: 36 NG/DL (ref 8–950)

## 2024-01-27 NOTE — TELEPHONE ENCOUNTER
DIAGNOSIS: (R) Pain in joint involving ankle and foot / Shannon Laboy APRN CNP / [facility] / no images    APPOINTMENT DATE: 2/9/24   NOTES STATUS DETAILS   OFFICE NOTE from referring provider Internal 1/15/24 - Shannon Laboy APRN CNP - [facility] Internal Med    MEDICATION LIST Internal

## 2024-02-09 ENCOUNTER — PRE VISIT (OUTPATIENT)
Dept: ORTHOPEDICS | Facility: CLINIC | Age: 49
End: 2024-02-09

## 2024-02-09 ENCOUNTER — OFFICE VISIT (OUTPATIENT)
Dept: ORTHOPEDICS | Facility: CLINIC | Age: 49
End: 2024-02-09
Attending: NURSE PRACTITIONER
Payer: COMMERCIAL

## 2024-02-09 DIAGNOSIS — M76.61 ACHILLES TENDINITIS OF RIGHT LOWER EXTREMITY: Primary | ICD-10-CM

## 2024-02-09 DIAGNOSIS — M25.571 PAIN IN JOINT INVOLVING ANKLE AND FOOT, RIGHT: ICD-10-CM

## 2024-02-09 PROCEDURE — 99203 OFFICE O/P NEW LOW 30 MIN: CPT | Performed by: PODIATRIST

## 2024-02-09 NOTE — NURSING NOTE
DME FITTING    Relevant Diagnosis: Achilles tendonitis  Tri-tobisa (large) brace was fit on patient's Right ankle.     Person(s) involved in teaching:   Patient    Brace was applied in standard Manner:  Yes  Brace fit well:  Yes  Patient reports brace to fit comfortably:  Yes    Education:   Patient shown self application and removal of brace: Yes  Patient shown how to adjust brace fit, if necessary: Yes  Patient educated on billing and return policy: Yes  Patient confirmed understanding when and how to contact clinic with concerns: Yes

## 2024-02-09 NOTE — LETTER
2/9/2024         RE: Phoenix M Stremski  [address] [place] [facility] [place] [address]        Dear Colleague,    Thank you for referring your patient, Phoenix M Stremski, to the [facility] [place]. Please see a copy of my visit note below.    Date of Service: 2/9/2024    Chief Complaint   Patient presents with    Consult     Pain, right achilles. Snap, crackle, pop-right great toe. Hx of injury as a child to the right foot/ankle. Patient relates that after she came out as trans, May 2021, she started working out really hard and running 10 miles a day.          HPI: Phoenix is a 49 year old adult who presents today for further evaluation of right Achilles pain.  Nature: sharp and dull    Location: right Achilles and plantar foot    Duration: 2 years    Onset: Started after she increased her activity.     Course: waxes and wanes    Aggravating/alleviating factors: walking and weight bearing aggravate. Rest alleviates.    Previous Treatments: RICE. Walking with a cane.       Review of Systems: No n/v/d/f/c/ns/sob/cp    PMH: No past medical history on file.    PSxH: No past surgical history on file.    Allergies: Zithromax [azithromycin dihydrate]    SH:   Social History     Socioeconomic History    Marital status:      Spouse name: Not on file    Number of children: Not on file    Years of education: Not on file    Highest education level: Not on file   Occupational History    Not on file   Tobacco Use    Smoking status: Never    Smokeless tobacco: Never   Vaping Use    Vaping Use: Never used   Substance and Sexual Activity    Alcohol use: Yes     Comment: 1 beer every 2 weeks    Drug use: Yes     Types: Marijuana     Comment: smoke    Sexual activity: Yes     Partners: Male, Female   Other Topics Concern    Not on file   Social History Narrative    Not on file     Social Determinants of Health     Financial Resource Strain: Low Risk  (1/8/2024)    Financial Resource Strain      Within the past 12 months, have you or your family members you live with been unable to get utilities (heat, electricity) when it was really needed?: No   Food Insecurity: Low Risk  (1/8/2024)    Food Insecurity     Within the past 12 months, did you worry that your food would run out before you got money to buy more?: No     Within the past 12 months, did the food you bought just not last and you didn t have money to get more?: No   Transportation Needs: Low Risk  (1/8/2024)    Transportation Needs     Within the past 12 months, has lack of transportation kept you from medical appointments, getting your medicines, non-medical meetings or appointments, work, or from getting things that you need?: No   Physical Activity: Sufficiently Active (8/30/2022)    Exercise Vital Sign     Days of Exercise per Week: 7 days     Minutes of Exercise per Session: 120 min   Stress: No Stress Concern Present (8/30/2022)    [place] [employer] - Occupational Stress Questionnaire     Feeling of Stress : Not at all   Social Connections: Moderately Integrated (8/30/2022)    Social Connection and Isolation Panel [NHANES]     Frequency of Communication with Friends and Family: Twice a week     Frequency of Social Gatherings with Friends and Family: Twice a week     Attends Zoroastrianism Services: Never     Active Member of Clubs or Organizations: Yes     Attends Club or Organization Meetings: Not on file     Marital Status:    Interpersonal Safety: Low Risk  (1/15/2024)    Interpersonal Safety     Do you feel physically and emotionally safe where you currently live?: Yes     Within the past 12 months, have you been hit, slapped, kicked or otherwise physically hurt by someone?: No     Within the past 12 months, have you been humiliated or emotionally abused in other ways by your partner or ex-partner?: No   Housing Stability: Low Risk  (1/8/2024)    Housing Stability     Do you have housing? : Yes     Are you worried  about losing your housing?: No       FH:   Family History   Problem Relation Age of Onset    Diabetes Mother     Cancer Father 76        brain    Diabetes Father        Objective:  Data Unavailable Data Unavailable Data Unavailable Data Unavailable Data Unavailable 0 lbs 0 oz    PT and DP pulses are 2/4 bilaterally. CRT is instant. Positive pedal hair.   Gross sensation is intact bilaterally.   Equinus is noted bilaterally. No pain with active or passive ROM of the ankle, MTJ, 1st ray, or halluces bilaterally,. Pain noted in the watershed area of the right Achilles tendon. Pain with MMT plantarflexion of the joint. MMT 5/5. Pain with palpation of the PT and peroneal tendons on the right ankle.   Nails normal bilaterally. No open lesions are noted.     Assessment:   Encounter Diagnoses   Name Primary?    Achilles tendinitis of right lower extremity Yes    Pain in joint involving ankle and foot, right          Plan:  - Pt seen and evaluated.  - We discussed tx options. She does feel OK in her Braga shoes. Recommend Tri-Lock ankle brace and PT. Brace was dispensed and PT ordered.  - See again in 8 weeks. If not resolved, consider advanced imaging.          Desean Randolph DPM

## 2024-02-09 NOTE — PROGRESS NOTES
Date of Service: 2/9/2024    Chief Complaint   Patient presents with    Consult     Pain, right achilles. Snap, crackle, pop-right great toe. Hx of injury as a child to the right foot/ankle. Patient relates that after she came out as trans, May 2021, she started working out really hard and running 10 miles a day.          HPI: Phoenix is a 49 year old adult who presents today for further evaluation of right Achilles pain.  Nature: sharp and dull    Location: right Achilles and plantar foot    Duration: 2 years    Onset: Started after she increased her activity.     Course: waxes and wanes    Aggravating/alleviating factors: walking and weight bearing aggravate. Rest alleviates.    Previous Treatments: RICE. Walking with a cane.       Review of Systems: No n/v/d/f/c/ns/sob/cp    PMH: No past medical history on file.    PSxH: No past surgical history on file.    Allergies: Zithromax [azithromycin dihydrate]    SH:   Social History     Socioeconomic History    Marital status:      Spouse name: Not on file    Number of children: Not on file    Years of education: Not on file    Highest education level: Not on file   Occupational History    Not on file   Tobacco Use    Smoking status: Never    Smokeless tobacco: Never   Vaping Use    Vaping Use: Never used   Substance and Sexual Activity    Alcohol use: Yes     Comment: 1 beer every 2 weeks    Drug use: Yes     Types: Marijuana     Comment: smoke    Sexual activity: Yes     Partners: Male, Female   Other Topics Concern    Not on file   Social History Narrative    Not on file     Social Determinants of Health     Financial Resource Strain: Low Risk  (1/8/2024)    Financial Resource Strain     Within the past 12 months, have you or your family members you live with been unable to get utilities (heat, electricity) when it was really needed?: No   Food Insecurity: Low Risk  (1/8/2024)    Food Insecurity     Within the past 12 months, did you worry that your food would  run out before you got money to buy more?: No     Within the past 12 months, did the food you bought just not last and you didn t have money to get more?: No   Transportation Needs: Low Risk  (1/8/2024)    Transportation Needs     Within the past 12 months, has lack of transportation kept you from medical appointments, getting your medicines, non-medical meetings or appointments, work, or from getting things that you need?: No   Physical Activity: Sufficiently Active (8/30/2022)    Exercise Vital Sign     Days of Exercise per Week: 7 days     Minutes of Exercise per Session: 120 min   Stress: No Stress Concern Present (8/30/2022)    [place] [employer] - Occupational Stress Questionnaire     Feeling of Stress : Not at all   Social Connections: Moderately Integrated (8/30/2022)    Social Connection and Isolation Panel [NHANES]     Frequency of Communication with Friends and Family: Twice a week     Frequency of Social Gatherings with Friends and Family: Twice a week     Attends Religion Services: Never     Active Member of Clubs or Organizations: Yes     Attends Club or Organization Meetings: Not on file     Marital Status:    Interpersonal Safety: Low Risk  (1/15/2024)    Interpersonal Safety     Do you feel physically and emotionally safe where you currently live?: Yes     Within the past 12 months, have you been hit, slapped, kicked or otherwise physically hurt by someone?: No     Within the past 12 months, have you been humiliated or emotionally abused in other ways by your partner or ex-partner?: No   Housing Stability: Low Risk  (1/8/2024)    Housing Stability     Do you have housing? : Yes     Are you worried about losing your housing?: No       FH:   Family History   Problem Relation Age of Onset    Diabetes Mother     Cancer Father 76        brain    Diabetes Father        Objective:  Data Unavailable Data Unavailable Data Unavailable Data Unavailable Data Unavailable 0 lbs 0  oz    PT and DP pulses are 2/4 bilaterally. CRT is instant. Positive pedal hair.   Gross sensation is intact bilaterally.   Equinus is noted bilaterally. No pain with active or passive ROM of the ankle, MTJ, 1st ray, or halluces bilaterally,. Pain noted in the watershed area of the right Achilles tendon. Pain with MMT plantarflexion of the joint. MMT 5/5. Pain with palpation of the PT and peroneal tendons on the right ankle.   Nails normal bilaterally. No open lesions are noted.     Assessment:   Encounter Diagnoses   Name Primary?    Achilles tendinitis of right lower extremity Yes    Pain in joint involving ankle and foot, right          Plan:  - Pt seen and evaluated.  - We discussed tx options. She does feel OK in her Braga shoes. Recommend Tri-Lock ankle brace and PT. Brace was dispensed and PT ordered.  - See again in 8 weeks. If not resolved, consider advanced imaging.

## 2024-02-10 ENCOUNTER — MYC REFILL (OUTPATIENT)
Dept: PEDIATRICS | Facility: CLINIC | Age: 49
End: 2024-02-10
Payer: COMMERCIAL

## 2024-02-10 DIAGNOSIS — Z78.9 MALE-TO-FEMALE TRANSGENDER PERSON: ICD-10-CM

## 2024-02-10 DIAGNOSIS — F64.9 GENDER DYSPHORIA: ICD-10-CM

## 2024-02-12 RX ORDER — SPIRONOLACTONE 25 MG/1
TABLET ORAL
Qty: 270 TABLET | Refills: 1 | OUTPATIENT
Start: 2024-02-12

## 2024-02-12 RX ORDER — ESTRADIOL 2 MG/1
4 TABLET ORAL 2 TIMES DAILY
Qty: 360 TABLET | Refills: 1 | OUTPATIENT
Start: 2024-02-12

## 2024-02-14 ENCOUNTER — THERAPY VISIT (OUTPATIENT)
Dept: PHYSICAL THERAPY | Facility: CLINIC | Age: 49
End: 2024-02-14
Attending: PODIATRIST
Payer: COMMERCIAL

## 2024-02-14 DIAGNOSIS — M25.571 PAIN IN JOINT INVOLVING ANKLE AND FOOT, RIGHT: ICD-10-CM

## 2024-02-14 DIAGNOSIS — M76.61 ACHILLES TENDINITIS OF RIGHT LOWER EXTREMITY: ICD-10-CM

## 2024-02-14 PROCEDURE — 97161 PT EVAL LOW COMPLEX 20 MIN: CPT | Mod: GP | Performed by: PHYSICAL THERAPIST

## 2024-02-14 PROCEDURE — 97110 THERAPEUTIC EXERCISES: CPT | Mod: GP | Performed by: PHYSICAL THERAPIST

## 2024-02-14 ASSESSMENT — ACTIVITIES OF DAILY LIVING (ADL)
LEFS_SCORE(%): 0
ROLLING_OVER_IN_BED: NO DIFFICULTY
GETTING_INTO_AND_OUT_OF_A_BATH: NO DIFFICULTY
WALKING_BETWEEN_ROOMS: MODERATE DIFFICULTY
PERFORMING_LIGHT_ACTIVITIES_AROUND_YOUR_HOME: A LITTLE BIT OF DIFFICULTY
LEFS_RAW_SCORE: 0
SHOPPING: A LITTLE BIT OF DIFFICULTY
RUNNING_ON_EVEN_GROUND: QUITE A BIT OF DIFFICULTY
GOING_UP_OR_DOWN_10_STAIRS: A LITTLE BIT OF DIFFICULTY
LIFTING_AN_OBJECT,_LIKE_A_BAG_OF_GROCERIES_FROM_THE_FLOOR: NO DIFFICULTY
STANDING_FOR_1_HOUR: MODERATE DIFFICULTY
PUTTING_ON_YOUR_SHOES_OR_SOCKS: NO DIFFICULTY
WALKING_A_MILE: MODERATE DIFFICULTY
SITTING_FOR_1_HOUR: MODERATE DIFFICULTY
YOUR_USUAL_HOBBIES,_RECREATIONAL_OR_SPORTING_ACTIVITIES: MODERATE DIFFICULTY
GETTING_INTO_OR_OUT_OF_A_CAR: NO DIFFICULTY
SQUATTING: A LITTLE BIT OF DIFFICULTY
ANY_OF_YOUR_USUAL_WORK,_HOUSEWORK_OR_SCHOOL_ACTIVITIES: MODERATE DIFFICULTY
RUNNING_ON_UNEVEN_GROUND: QUITE A BIT OF DIFFICULTY
MAKING_SHARP_TURNS_WHILE_RUNNING_FAST: QUITE A BIT OF DIFFICULTY
WALKING_2_BLOCKS: MODERATE DIFFICULTY
PERFORMING_HEAVY_ACTIVITIES_AROUND_YOUR_HOME: A LITTLE BIT OF DIFFICULTY
PLEASE_INDICATE_YOR_PRIMARY_REASON_FOR_REFERRAL_TO_THERAPY:: FOOT AND/OR ANKLE

## 2024-02-14 NOTE — PROGRESS NOTES
"PHYSICAL THERAPY EVALUATION  Type of Visit: Evaluation    See electronic medical record for Abuse and Falls Screening details.    Subjective       Presenting condition or subjective complaint: After a time of inactivity through the pandemic, she began to walk again anywhere frrom 6-10 miles per day. She states that this really aggravated her achilles. Despite self stretching, and utilizing cane her pain has persisted  Date of onset: 02/09/24 (MD fisher)    Relevant medical history:     Dates & types of surgery:      Prior diagnostic imaging/testing results:       Prior therapy history for the same diagnosis, illness or injury: No      Prior Level of Function  Transfers: Independent  Ambulation: Independent  ADL: Independent  IADL: Driving, Finances, Housekeeping, Laundry, Meal preparation    Living Environment  Social support: With a significant other or spouse   Type of home: House   Stairs to enter the home: Yes 4 Is there a railing: Yes   Ramp: No   Stairs inside the home: Yes 10 Is there a railing: Yes   Help at home: None  Equipment owned: Straight Cane     Employment: No    Hobbies/Interests:      Patient goals for therapy: I'd like to resume a fairly intense exercise program, walking/hiking/kayaking/biking for many miles & hours per day. My ankle has been \"quitting\" in the middle of long hikes and I've been relying on a walking stick for intense trips, ro. in the morning.    Pain assessment: Location: Musculotendinous junction of R Achilles/Rating: 3-7-10/10     Objective   FOOT/ANKLE EVALUATION  PAIN: Pain is Exacerbated By: walking, stairs  Pain is Relieved By: rest and stretch  INTEGUMENTARY (edema, incisions): WNL    GAIT:   Weightbearing Status: WBAT  Assistive Device(s): None  Gait Deviations: Pronation increased R      ROM:   (Degrees) Left AROM Left PROM  Right AROM Right PROM   Ankle Dorsiflexion Closed chain 11 cm 20 Closed chain 10 cm 12   Ankle Plantarflexion  40  40   Ankle Inversion  30  30 "   Ankle Eversion  15  15   Great Toe Flexion  60  60   Great Toe Extension  60  60   Pain:   End feel:     STRENGTH:   Pain: - none + mild ++ moderate +++ severe  Strength Scale: 0-5/5 Left Right   Ankle Dorsiflexion 5 4+   Ankle Plantarflexion 5 3+, ++ (mod)   Ankle Inversion 5 5   Ankle Eversion 5- 4   Great Toe Flexion 5 5   Great Toe Extension 5 5     FLEXIBILITY:  Gastroc/soleus tightness  SPECIAL TESTS:    Left Right   Tinel Sign Negative  Negative    Gerard Sign Negative  Negative    Windlass Test Negative  Negative    Ligamentous Stability     Anterior Drawer Negative Negative   Kleiger ER Test Negative Negative   Longitudinal Arch Angle Test Negative Negative   Talar Tilt Negative Negative     FUNCTIONAL TESTS: Double Leg Squat: Anterior knee translation, Knee valgus, Hip internal rotation, and Improper use of glutes/hips  SLS: Arch collapse R  PALPATION:  TTP along musculotendinous junction of R achilles  JOINT MOBILITY:  subtalar joint stiffness R    Assessment & Plan   CLINICAL IMPRESSIONS  Medical Diagnosis: Pain in joint involving ankle and foot, right  Achilles tendinitis of right lower extremity)    Treatment Diagnosis: Right ankle pain   Impression/Assessment: Patient is a 49 year old adult with Right ankle complaints.  The following significant findings have been identified: Pain, Decreased ROM/flexibility, Decreased joint mobility, Decreased strength, Impaired balance, and Decreased proprioception. These impairments interfere with their ability to perform recreational activities, household chores, household mobility, and community mobility as compared to previous level of function.     Clinical Decision Making (Complexity):  Clinical Presentation: Stable/Uncomplicated  Clinical Presentation Rationale: based on medical and personal factors listed in PT evaluation  Clinical Decision Making (Complexity): Low complexity    PLAN OF CARE  Treatment Interventions:  Modalities: Cryotherapy, Dry  Needling  Interventions: Gait Training, Manual Therapy, Neuromuscular Re-education, Therapeutic Activity, Therapeutic Exercise, Self-Care/Home Management    Long Term Goals     PT Goal 1  Goal Identifier: Ambulation  Goal Description: Patient will ambulate 1.5 miles on even ground without AD and 1/10 pain  Rationale: to maximize safety and independence with performance of ADLs and functional tasks;to maximize safety and independence within the community  Target Date: 05/08/24      Frequency of Treatment: 1x per week  Duration of Treatment: 6 weeks    Recommended Referrals to Other Professionals:  Seeing Dr. Randolph  Education Assessment:   Learner/Method: Patient;Pictures/Video;No Barriers to Learning    Risks and benefits of evaluation/treatment have been explained.   Patient/Family/caregiver agrees with Plan of Care.     Evaluation Time:     PT Eval, Low Complexity Minutes (76083): 25     Signing Clinician: Charlene Glass PT

## 2024-02-15 ENCOUNTER — TELEPHONE (OUTPATIENT)
Dept: PLASTIC SURGERY | Facility: CLINIC | Age: 49
End: 2024-02-15
Payer: COMMERCIAL

## 2024-02-29 ENCOUNTER — THERAPY VISIT (OUTPATIENT)
Dept: PHYSICAL THERAPY | Facility: CLINIC | Age: 49
End: 2024-02-29
Payer: COMMERCIAL

## 2024-02-29 DIAGNOSIS — M76.61 ACHILLES TENDINITIS OF RIGHT LOWER EXTREMITY: ICD-10-CM

## 2024-02-29 DIAGNOSIS — M25.571 PAIN IN JOINT INVOLVING ANKLE AND FOOT, RIGHT: Primary | ICD-10-CM

## 2024-02-29 PROCEDURE — 97140 MANUAL THERAPY 1/> REGIONS: CPT | Mod: GP | Performed by: PHYSICAL THERAPIST

## 2024-02-29 PROCEDURE — 97110 THERAPEUTIC EXERCISES: CPT | Mod: GP | Performed by: PHYSICAL THERAPIST

## 2024-03-07 ENCOUNTER — THERAPY VISIT (OUTPATIENT)
Dept: PHYSICAL THERAPY | Facility: CLINIC | Age: 49
End: 2024-03-07
Payer: COMMERCIAL

## 2024-03-07 DIAGNOSIS — M76.61 ACHILLES TENDINITIS OF RIGHT LOWER EXTREMITY: ICD-10-CM

## 2024-03-07 DIAGNOSIS — M25.571 PAIN IN JOINT INVOLVING ANKLE AND FOOT, RIGHT: Primary | ICD-10-CM

## 2024-03-07 PROCEDURE — 97140 MANUAL THERAPY 1/> REGIONS: CPT | Mod: GP | Performed by: PHYSICAL THERAPIST

## 2024-03-07 PROCEDURE — 97110 THERAPEUTIC EXERCISES: CPT | Mod: GP | Performed by: PHYSICAL THERAPIST

## 2024-03-14 ENCOUNTER — THERAPY VISIT (OUTPATIENT)
Dept: PHYSICAL THERAPY | Facility: CLINIC | Age: 49
End: 2024-03-14
Payer: COMMERCIAL

## 2024-03-14 DIAGNOSIS — M76.61 ACHILLES TENDINITIS OF RIGHT LOWER EXTREMITY: ICD-10-CM

## 2024-03-14 DIAGNOSIS — M25.571 PAIN IN JOINT INVOLVING ANKLE AND FOOT, RIGHT: Primary | ICD-10-CM

## 2024-03-14 PROCEDURE — 97140 MANUAL THERAPY 1/> REGIONS: CPT | Mod: GP | Performed by: PHYSICAL THERAPIST

## 2024-03-14 PROCEDURE — 97112 NEUROMUSCULAR REEDUCATION: CPT | Mod: GP | Performed by: PHYSICAL THERAPIST

## 2024-03-14 PROCEDURE — 97110 THERAPEUTIC EXERCISES: CPT | Mod: GP | Performed by: PHYSICAL THERAPIST

## 2024-03-29 ENCOUNTER — TELEPHONE (OUTPATIENT)
Dept: PLASTIC SURGERY | Facility: CLINIC | Age: 49
End: 2024-03-29
Payer: COMMERCIAL

## 2024-03-31 DIAGNOSIS — F64.9 GENDER DYSPHORIA: Primary | ICD-10-CM

## 2024-03-31 RX ORDER — CEFAZOLIN SODIUM 2 G/50ML
2 SOLUTION INTRAVENOUS
Status: CANCELLED | OUTPATIENT
Start: 2024-03-31

## 2024-03-31 RX ORDER — CEFAZOLIN SODIUM 2 G/50ML
2 SOLUTION INTRAVENOUS SEE ADMIN INSTRUCTIONS
Status: CANCELLED | OUTPATIENT
Start: 2024-03-31

## 2024-03-31 RX ORDER — HEPARIN SODIUM 5000 [USP'U]/.5ML
5000 INJECTION, SOLUTION INTRAVENOUS; SUBCUTANEOUS
Status: CANCELLED | OUTPATIENT
Start: 2024-03-31

## 2024-04-03 ENCOUNTER — TELEPHONE (OUTPATIENT)
Dept: UROLOGY | Facility: CLINIC | Age: 49
End: 2024-04-03
Payer: COMMERCIAL

## 2024-04-03 NOTE — TELEPHONE ENCOUNTER
Left message regarding scheduling surgery/procedure with Dr. Dacosta.   Writer left call back number on the patients voicemail       Josie Bonilla on 4/3/2024 at 3:15 PM   P: [phone]

## 2024-04-04 ENCOUNTER — TELEPHONE (OUTPATIENT)
Dept: UROLOGY | Facility: CLINIC | Age: 49
End: 2024-04-04

## 2024-04-04 NOTE — TELEPHONE ENCOUNTER
[facility] Call Center    Phone Message    May a detailed message be left on voicemail: yes     Reason for Call: Appointment Intake    Referring Provider Name: Dr. Dacosta  Diagnosis and/or Symptoms: Surgery per last TE    Action Taken: Message routed to:  [facility] Urology    Travel Screening: Not Applicable

## 2024-04-04 NOTE — TELEPHONE ENCOUNTER
[facility] Call Center    Phone Message    May a detailed message be left on voicemail: no     Reason for Call: Other: Patient is calling back to speak to anisa about scheduling a surgery.     Action Taken: Message routed to:  [facility] Urology    Travel Screening: Not Applicable

## 2024-04-05 ENCOUNTER — THERAPY VISIT (OUTPATIENT)
Dept: PHYSICAL THERAPY | Facility: CLINIC | Age: 49
End: 2024-04-05
Payer: COMMERCIAL

## 2024-04-05 ENCOUNTER — OFFICE VISIT (OUTPATIENT)
Dept: ORTHOPEDICS | Facility: CLINIC | Age: 49
End: 2024-04-05
Payer: COMMERCIAL

## 2024-04-05 DIAGNOSIS — M25.571 PAIN IN JOINT INVOLVING ANKLE AND FOOT, RIGHT: Primary | ICD-10-CM

## 2024-04-05 DIAGNOSIS — M76.61 ACHILLES TENDINITIS OF RIGHT LOWER EXTREMITY: ICD-10-CM

## 2024-04-05 PROCEDURE — 99213 OFFICE O/P EST LOW 20 MIN: CPT | Performed by: PODIATRIST

## 2024-04-05 PROCEDURE — 97110 THERAPEUTIC EXERCISES: CPT | Mod: GP | Performed by: PHYSICAL THERAPIST

## 2024-04-05 PROCEDURE — 97530 THERAPEUTIC ACTIVITIES: CPT | Mod: GP | Performed by: PHYSICAL THERAPIST

## 2024-04-05 NOTE — PROGRESS NOTES
Date of Service: 2/9/2024    Chief Complaint   Patient presents with    Follow Up     6 week follow up. Pain, right ankle.          HPI: Phoenix is a 49 year old adult who presents today for further evaluation of right Achilles pain.  Nature: sharp and dull    Location: right Achilles and plantar foot    Duration: 2 years    Onset: Started after she increased her activity.     Course: waxes and wanes    Aggravating/alleviating factors: walking and weight bearing aggravate. Rest alleviates.    Previous Treatments: RICE. Walking with a cane.     Interval hx: She went to PT and wore the brace and this was helpful. Feeling much better.       Review of Systems: No n/v/d/f/c/ns/sob/cp    PMH: No past medical history on file.    PSxH: No past surgical history on file.    Allergies: Zithromax [azithromycin dihydrate]    SH:   Social History     Socioeconomic History    Marital status:      Spouse name: Not on file    Number of children: Not on file    Years of education: Not on file    Highest education level: Not on file   Occupational History    Not on file   Tobacco Use    Smoking status: Never    Smokeless tobacco: Never   Vaping Use    Vaping Use: Never used   Substance and Sexual Activity    Alcohol use: Yes     Comment: 1 beer every 2 weeks    Drug use: Yes     Types: Marijuana     Comment: smoke    Sexual activity: Yes     Partners: Male, Female   Other Topics Concern    Not on file   Social History Narrative    Not on file     Social Determinants of Health     Financial Resource Strain: Low Risk  (1/8/2024)    Financial Resource Strain     Within the past 12 months, have you or your family members you live with been unable to get utilities (heat, electricity) when it was really needed?: No   Food Insecurity: Low Risk  (1/8/2024)    Food Insecurity     Within the past 12 months, did you worry that your food would run out before you got money to buy more?: No     Within the past 12 months, did the food you  bought just not last and you didn t have money to get more?: No   Transportation Needs: Low Risk  (1/8/2024)    Transportation Needs     Within the past 12 months, has lack of transportation kept you from medical appointments, getting your medicines, non-medical meetings or appointments, work, or from getting things that you need?: No   Physical Activity: Sufficiently Active (8/30/2022)    Exercise Vital Sign     Days of Exercise per Week: 7 days     Minutes of Exercise per Session: 120 min   Stress: No Stress Concern Present (8/30/2022)    [employer] [place] [employer] - Occupational Stress Questionnaire     Feeling of Stress : Not at all   Social Connections: Moderately Integrated (8/30/2022)    Social Connection and Isolation Panel [NHANES]     Frequency of Communication with Friends and Family: Twice a week     Frequency of Social Gatherings with Friends and Family: Twice a week     Attends Jainism Services: Never     Active Member of Clubs or Organizations: Yes     Attends Club or Organization Meetings: Not on file     Marital Status:    Interpersonal Safety: Low Risk  (1/15/2024)    Interpersonal Safety     Do you feel physically and emotionally safe where you currently live?: Yes     Within the past 12 months, have you been hit, slapped, kicked or otherwise physically hurt by someone?: No     Within the past 12 months, have you been humiliated or emotionally abused in other ways by your partner or ex-partner?: No   Housing Stability: Low Risk  (1/8/2024)    Housing Stability     Do you have housing? : Yes     Are you worried about losing your housing?: No       FH:   Family History   Problem Relation Age of Onset    Diabetes Mother     Cancer Father 76        brain    Diabetes Father        Objective:  Data Unavailable Data Unavailable Data Unavailable Data Unavailable Data Unavailable 0 lbs 0 oz    PT and DP pulses are 2/4 bilaterally. CRT is instant. Positive pedal hair.   Gross  sensation is intact bilaterally.   Equinus is noted bilaterally. No pain with active or passive ROM of the ankle, MTJ, 1st ray, or halluces bilaterally,. No Pain noted in the watershed area of the right Achilles tendon. Slight Pain with MMT plantarflexion of the joint. MMT 5/5. No Pain with palpation of the PT and peroneal tendons on the right ankle.   Nails normal bilaterally. No open lesions are noted.     Assessment:   Encounter Diagnoses   Name Primary?    Pain in joint involving ankle and foot, right Yes    Achilles tendinitis of right lower extremity              Plan:  - Pt seen and evaluated.  - We discussed tx options. She can now start Couch to 5K program with the brace. No brace with regular walking.   - See again PRN.

## 2024-04-05 NOTE — LETTER
4/5/2024         RE: Phoenix M Stremski  [address] [facility] [address]        Dear Colleague,    Thank you for referring your patient, Phoenix M Stremski, to the [facility] [place]. Please see a copy of my visit note below.    Date of Service: 2/9/2024    Chief Complaint   Patient presents with    Follow Up     6 week follow up. Pain, right ankle.          HPI: Phoenix is a 49 year old adult who presents today for further evaluation of right Achilles pain.  Nature: sharp and dull    Location: right Achilles and plantar foot    Duration: 2 years    Onset: Started after she increased her activity.     Course: waxes and wanes    Aggravating/alleviating factors: walking and weight bearing aggravate. Rest alleviates.    Previous Treatments: RICE. Walking with a cane.     Interval hx: She went to PT and wore the brace and this was helpful. Feeling much better.       Review of Systems: No n/v/d/f/c/ns/sob/cp    PMH: No past medical history on file.    PSxH: No past surgical history on file.    Allergies: Zithromax [azithromycin dihydrate]    SH:   Social History     Socioeconomic History    Marital status:      Spouse name: Not on file    Number of children: Not on file    Years of education: Not on file    Highest education level: Not on file   Occupational History    Not on file   Tobacco Use    Smoking status: Never    Smokeless tobacco: Never   Vaping Use    Vaping Use: Never used   Substance and Sexual Activity    Alcohol use: Yes     Comment: 1 beer every 2 weeks    Drug use: Yes     Types: Marijuana     Comment: smoke    Sexual activity: Yes     Partners: Male, Female   Other Topics Concern    Not on file   Social History Narrative    Not on file     Social Determinants of Health     Financial Resource Strain: Low Risk  (1/8/2024)    Financial Resource Strain     Within the past 12 months, have you or your family members you live with been unable to get  utilities (heat, electricity) when it was really needed?: No   Food Insecurity: Low Risk  (1/8/2024)    Food Insecurity     Within the past 12 months, did you worry that your food would run out before you got money to buy more?: No     Within the past 12 months, did the food you bought just not last and you didn t have money to get more?: No   Transportation Needs: Low Risk  (1/8/2024)    Transportation Needs     Within the past 12 months, has lack of transportation kept you from medical appointments, getting your medicines, non-medical meetings or appointments, work, or from getting things that you need?: No   Physical Activity: Sufficiently Active (8/30/2022)    Exercise Vital Sign     Days of Exercise per Week: 7 days     Minutes of Exercise per Session: 120 min   Stress: No Stress Concern Present (8/30/2022)    [employer] [place] - Occupational Stress Questionnaire     Feeling of Stress : Not at all   Social Connections: Moderately Integrated (8/30/2022)    Social Connection and Isolation Panel [NHANES]     Frequency of Communication with Friends and Family: Twice a week     Frequency of Social Gatherings with Friends and Family: Twice a week     Attends Hindu Services: Never     Active Member of Clubs or Organizations: Yes     Attends Club or Organization Meetings: Not on file     Marital Status:    Interpersonal Safety: Low Risk  (1/15/2024)    Interpersonal Safety     Do you feel physically and emotionally safe where you currently live?: Yes     Within the past 12 months, have you been hit, slapped, kicked or otherwise physically hurt by someone?: No     Within the past 12 months, have you been humiliated or emotionally abused in other ways by your partner or ex-partner?: No   Housing Stability: Low Risk  (1/8/2024)    Housing Stability     Do you have housing? : Yes     Are you worried about losing your housing?: No       FH:   Family History   Problem Relation Age of Onset     Diabetes Mother     Cancer Father 76        brain    Diabetes Father        Objective:  Data Unavailable Data Unavailable Data Unavailable Data Unavailable Data Unavailable 0 lbs 0 oz    PT and DP pulses are 2/4 bilaterally. CRT is instant. Positive pedal hair.   Gross sensation is intact bilaterally.   Equinus is noted bilaterally. No pain with active or passive ROM of the ankle, MTJ, 1st ray, or halluces bilaterally,. No Pain noted in the watershed area of the right Achilles tendon. Slight Pain with MMT plantarflexion of the joint. MMT 5/5. No Pain with palpation of the PT and peroneal tendons on the right ankle.   Nails normal bilaterally. No open lesions are noted.     Assessment:   Encounter Diagnoses   Name Primary?    Pain in joint involving ankle and foot, right Yes    Achilles tendinitis of right lower extremity              Plan:  - Pt seen and evaluated.  - We discussed tx options. She can now start Couch to 5K program with the brace. No brace with regular walking.   - See again PRN.            Desean Randolph DPM

## 2024-04-08 NOTE — TELEPHONE ENCOUNTER
Called patient to schedule surgery with Dr. Dacosta.   Patient called with potential opportunity to move to surgery date of 5/17. Patient declined as has other commitments in June. Elected to keep surgery scheduled for July 8th     Date of Surgery: 7/8/2024     Approximate arrival time given:   discussed with patient    Location of surgery  [facility] [place]    Pre-Op H&P: PCP - [facility] Delfina Che, patient will call to scheduled within 30 days    Post-Op Appt Date: 7/23/2024      Patient aware that pre-op RN will call 2-3 days prior to surgery with arrival time and instructions Yes    Packet sent out: Yes 04/08/24  + Minimal Packet + soap     Additional Comments:   Patient has convention until the evening of 7/7/2024. Discussed arrival time could be as early as 530am. Pt would prefer not to be that early. Informed patient that writer would make note, but arrival times are not confirmed until closer to surgical date.      All patients questions were answered and was instructed to review surgical packet and call back with any questions or concerns.

## 2024-05-02 ENCOUNTER — THERAPY VISIT (OUTPATIENT)
Dept: PHYSICAL THERAPY | Facility: CLINIC | Age: 49
End: 2024-05-02
Payer: COMMERCIAL

## 2024-05-02 DIAGNOSIS — M25.571 PAIN IN JOINT INVOLVING ANKLE AND FOOT, RIGHT: Primary | ICD-10-CM

## 2024-05-02 DIAGNOSIS — M76.61 ACHILLES TENDINITIS OF RIGHT LOWER EXTREMITY: ICD-10-CM

## 2024-05-02 PROCEDURE — 97112 NEUROMUSCULAR REEDUCATION: CPT | Mod: GP | Performed by: PHYSICAL THERAPIST

## 2024-05-02 PROCEDURE — 97110 THERAPEUTIC EXERCISES: CPT | Mod: GP | Performed by: PHYSICAL THERAPIST

## 2024-05-02 NOTE — PROGRESS NOTES
DISCHARGE  Reason for Discharge: Patient has met all goals.    Equipment Issued: HEP    Discharge Plan: Patient to continue home program.    Referring Provider:  Desean Randolph       05/02/24 0500   Appointment Info   Signing clinician's name / credentials Charlene Glass. DPT, OCS   Total/Authorized Visits 6 per PT   Visits Used 6   Medical Diagnosis Pain in joint involving ankle and foot, right  Achilles tendinitis of right lower extremity)   PT Tx Diagnosis Right ankle pain   Progress Note/Certification   Onset of illness/injury or Date of Surgery 02/09/24  (MD order)   Therapy Frequency 1x per week   Predicted Duration 6 weeks   PT Goal 1   Goal Identifier Ambulation   Goal Description Patient will ambulate 1.5 miles on even ground without AD and 1/10 pain   Rationale to maximize safety and independence with performance of ADLs and functional tasks;to maximize safety and independence within the community   Target Date 05/08/24   Date Met 05/02/24   Subjective Report   Subjective Report Phoenix states that she is doing okay. She is beginning to get pain again with 1/2 mile walks, but knows when she is doing her PT exericses that she feels better.. Phoenix feels ready to discharge from PT with use of HEP   Objective Measures   Objective Measures Objective Measure 1   Objective Measure 1   Objective Measure Gastroc flexibility   Details Increased tension lateral gastroc   Treatment Interventions (PT)   Interventions Therapeutic Procedure/Exercise;Neuromuscular Re-education   Therapeutic Procedure/Exercise   Therapeutic Procedures: strength, endurance, ROM, flexibility minutes (18325) 15   Therapeutic Procedures Ther Proc 5   Ther Proc 1 Plantarflexion with band   Ther Proc 1 - Details Education on frequency, duration, and resistance   Ther Proc 2 Heel raises   Ther Proc 2 - Details VC to stay on inside of foot, trialed edge of step but DC due to pain. Keep on ground for now   Ther Proc 3 Side stepping  with band around arches   Ther Proc 3 - Details Education on frequency, duration, and resistance   Ther Proc 4 Tandem walking   Ther Proc 4 - Details VC to go slow and controlled   Skilled Intervention Assessment of strength and functional deficits to determine exercise prescription; tactile and verbal cuing to assist with proper performance; education in loading principles and expected response   Therapeutic Activity   Ther Act 1 Return to run protocol   Ther Act 1 - Details Education on frequency, duration, intensity, proper shoewear, rest days, and signs of irritation   Skilled Intervention Education on proper return to run.   Neuromuscular Re-education   Neuromuscular re-ed of mvmt, balance, coord, kinesthetic sense, posture, proprioception minutes (78066) 15   Neuro Re-ed 1 Dynamic balance   Neuro Re-ed 1 - Details blaze pods: tandem, semi tandem, gaze drils   Skilled Intervention Increase coordination of lower extremity musculature to decrease abberant movement patterns. tactile and verbal cuing to assist with proper performance; education in coordnation principles and expected response   Manual Therapy   Manual Therapy 1 Gastroc release   Manual Therapy 1 - Details Accupressure and strripping for tightness and TrPs   Skilled Intervention Identified precautions and contraindications to manual treatment; assessed mobility and pain; adjusted technique based on patient response.   Education   Learner/Method Patient;Pictures/Video;No Barriers to Learning

## 2024-06-12 ENCOUNTER — TELEPHONE (OUTPATIENT)
Dept: PLASTIC SURGERY | Facility: CLINIC | Age: 49
End: 2024-06-12
Payer: COMMERCIAL

## 2024-06-12 NOTE — TELEPHONE ENCOUNTER
Pre and Post Op Patient Education                                       Diagnosis: gender dysphoria  Teaching pre and post op for: minimal depth vaginoplasty  Person involved in teaching: patient    Patient demonstrates an understanding of the following:  - Date of surgery:  7/8/24 - Monday  - Surgery time: 11:55 am (pt understands that this time could change)  - Location of surgery: [facility]) - [address], [place] [address]     - Pre-operative history physical: [facility] 6/25/24      - Post-op follow-up:   7/23/24 at 2:20 pm with Dr. Dacosta  8/19/24 at 10:30 am with Giuliana Rsoario NP      Patient verbalizes an understanding of the following:  - The need for a responsible adult  and someone to stay with them for the first 24 hours post-operatively: Pt to be in hospital x2 days after surgery. Will be getting a ride home.  - NPO per anesthesia guidelines: Yes  - Pre-op showering x2 with Hibiclens/chlorhexidine soap: Yes  - The need to stop/discontinue all hormones 1 weeks before surgery and may restart upon return home after surgery: Pt to stop PO estradiol on 7/1/24 and restart when she discharges after surgery. Advised pt that spironolactone does not need to be restarted after surgery and that she should schedule an appointment with hormone prescriber 1-3 months after surgery for hormone level check.      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 She informed partners that she will be unable to be working on her feet until labor day.  She is able to take a break from her work to adhere to activity restriction and focus on recovery. She lives with her spouse who will be able to help support pt with things she cannot do after surgery. She also has a boyfriend, friends, and her mother for support as needed. She feels comfortable with her recovery plan.     Surgical instructions given to patient via phone.    Total time with patient: 85 minutes    Pranav Brown RN

## 2024-06-25 ENCOUNTER — OFFICE VISIT (OUTPATIENT)
Dept: PEDIATRICS | Facility: CLINIC | Age: 49
End: 2024-06-25
Payer: COMMERCIAL

## 2024-06-25 VITALS
HEART RATE: 76 BPM | BODY MASS INDEX: 37.62 KG/M2 | HEIGHT: 69 IN | RESPIRATION RATE: 16 BRPM | WEIGHT: 254 LBS | DIASTOLIC BLOOD PRESSURE: 70 MMHG | TEMPERATURE: 97.5 F | SYSTOLIC BLOOD PRESSURE: 104 MMHG | OXYGEN SATURATION: 98 %

## 2024-06-25 DIAGNOSIS — Z01.818 PREOP GENERAL PHYSICAL EXAM: Primary | ICD-10-CM

## 2024-06-25 DIAGNOSIS — F64.9 GENDER DYSPHORIA: ICD-10-CM

## 2024-06-25 DIAGNOSIS — Z78.9 MALE-TO-FEMALE TRANSGENDER PERSON: ICD-10-CM

## 2024-06-25 LAB — HGB BLD-MCNC: 14.7 G/DL (ref 11.7–17.7)

## 2024-06-25 PROCEDURE — 99214 OFFICE O/P EST MOD 30 MIN: CPT | Performed by: NURSE PRACTITIONER

## 2024-06-25 PROCEDURE — 85018 HEMOGLOBIN: CPT | Performed by: NURSE PRACTITIONER

## 2024-06-25 PROCEDURE — 36415 COLL VENOUS BLD VENIPUNCTURE: CPT | Performed by: NURSE PRACTITIONER

## 2024-06-25 ASSESSMENT — PATIENT HEALTH QUESTIONNAIRE - PHQ9
SUM OF ALL RESPONSES TO PHQ QUESTIONS 1-9: 5
10. IF YOU CHECKED OFF ANY PROBLEMS, HOW DIFFICULT HAVE THESE PROBLEMS MADE IT FOR YOU TO DO YOUR WORK, TAKE CARE OF THINGS AT HOME, OR GET ALONG WITH OTHER PEOPLE: SOMEWHAT DIFFICULT
SUM OF ALL RESPONSES TO PHQ QUESTIONS 1-9: 5

## 2024-06-25 ASSESSMENT — PAIN SCALES - GENERAL: PAINLEVEL: NO PAIN (0)

## 2024-06-25 NOTE — PATIENT INSTRUCTIONS
How to Take Your Medication Before Surgery  Preoperative Medication Instructions     The need to stop/discontinue all hormones 1 weeks before surgery and may restart upon return home after surgery: Pt to stop PO estradiol on 24 and restart when she discharges after surgery. Advised pt that spironolactone does not need to be restarted after surgery         Patient Education   Preparing for Your Surgery  Getting started  A nurse will call you to review your health history and instructions. They will give you an arrival time based on your scheduled surgery time. Please be ready to share:  Your doctor's clinic name and phone number  Your medical, surgical, and anesthesia history  A list of allergies and sensitivities  A list of medicines, including herbal treatments and over-the-counter drugs  Whether the patient has a legal guardian (ask how to send us the papers in advance)  Please tell us if you're pregnant--or if there's any chance you might be pregnant. Some surgeries may injure a fetus (unborn baby), so they require a pregnancy test. Surgeries that are safe for a fetus don't always need a test, and you can choose whether to have one.   If you have a child who's having surgery, please ask for a copy of Preparing for Your Child's Surgery.    Preparing for surgery  Within 10 to 30 days of surgery: Have a pre-op exam (sometimes called an H&P, or History and Physical). This can be done at a clinic or pre-operative center.  If you're having a , you may not need this exam. Talk to your care team.  At your pre-op exam, talk to your care team about all medicines you take. If you need to stop any medicines before surgery, ask when to start taking them again.  We do this for your safety. Many medicines can make you bleed too much during surgery. Some change how well surgery (anesthesia) drugs work.  Call your insurance company to let them know you're having surgery. (If you don't have insurance, call  [phone].)  Call your clinic if there's any change in your health. This includes signs of a cold or flu (sore throat, runny nose, cough, rash, fever). It also includes a scrape or scratch near the surgery site.  If you have questions on the day of surgery, call your hospital or surgery center.  Eating and drinking guidelines  For your safety: Unless your surgeon tells you otherwise, follow the guidelines below.  Eat and drink as usual until 8 hours before you arrive for surgery. After that, no food or milk.  Drink clear liquids until 2 hours before you arrive. These are liquids you can see through, like water, Gatorade, and Propel Water. They also include plain black coffee and tea (no cream or milk), candy, and breath mints. You can spit out gum when you arrive.  If you drink alcohol: Stop drinking it the night before surgery.  If your care team tells you to take medicine on the morning of surgery, it's okay to take it with a sip of water.  Preventing infection  Shower or bathe the night before and morning of your surgery. Follow the instructions your clinic gave you. (If no instructions, use regular soap.)  Don't shave or clip hair near your surgery site. We'll remove the hair if needed.  Don't smoke or vape the morning of surgery. You may chew nicotine gum up to 2 hours before surgery. A nicotine patch is okay.  Note: Some surgeries require you to completely quit smoking and nicotine. Check with your surgeon.  Your care team will make every effort to keep you safe from infection. We will:  Clean our hands often with soap and water (or an alcohol-based hand rub).  Clean the skin at your surgery site with a special soap that kills germs.  Give you a special gown to keep you warm. (Cold raises the risk of infection.)  Wear special hair covers, masks, gowns and gloves during surgery.  Give antibiotic medicine, if prescribed. Not all surgeries need antibiotics.  What to bring on the day of surgery  Photo ID and  insurance card  Copy of your health care directive, if you have one  Glasses and hearing aids (bring cases)  You can't wear contacts during surgery  Inhaler and eye drops, if you use them (tell us about these when you arrive)  CPAP machine or breathing device, if you use them  A few personal items, if spending the night  If you have . . .  A pacemaker, ICD (cardiac defibrillator) or other implant: Bring the ID card.  An implanted stimulator: Bring the remote control.  A legal guardian: Bring a copy of the certified (court-stamped) guardianship papers.  Please remove any jewelry, including body piercings. Leave jewelry and other valuables at home.  If you're going home the day of surgery  You must have a responsible adult drive you home. They should stay with you overnight as well.  If you don't have someone to stay with you, and you aren't safe to go home alone, we may keep you overnight. Insurance often won't pay for this.  After surgery  If it's hard to control your pain or you need more pain medicine, please call your surgeon's office.  Questions?   If you have any questions for your care team, list them here: _________________________________________________________________________________________________________________________________________________________________________ ____________________________________ ____________________________________ ____________________________________  For informational purposes only. Not to replace the advice of your health care provider. Copyright   2003, 2019 [facility]. All rights reserved. Clinically reviewed by Teresa Kendrick MD. [employer] [id number] - REV 12/22.

## 2024-06-25 NOTE — PROGRESS NOTES
Preoperative Evaluation  [facility] [place]  [address]  [address]  [place] [address]  Phone: [phone]  Fax: [phone]  Primary Provider: JOHANNA Espinoza CNP  Pre-op Performing Provider: JOHANNA Esipnoza CNP  Jun 25, 2024 6/25/2024   Surgical Information   What procedure is being done? minimal depth vagioplasty   Facility or Hospital where procedure/surgery will be performed: [facility]   Who is doing the procedure / surgery? Dr Dacosta   Date of surgery / procedure: [id number]   Time of surgery / procedure: tbd   Where do you plan to recover after surgery? at home with family        Fax number for surgical facility: Note does not need to be faxed, will be available electronically in Epic.    Assessment & Plan     The proposed surgical procedure is considered INTERMEDIATE risk.    Preop general physical exam      Gender dysphoria              - No identified additional risk factors other than previously addressed    The need to stop/discontinue all hormones 1 weeks before surgery and may restart upon return home after surgery: Pt to stop PO estradiol on 7/1/24 and restart when she discharges after surgery. Advised pt that spironolactone does not need to be restarted after surgery      Recommendation  Approval given to proceed with proposed procedure, without further diagnostic evaluation.    Subjective   Phoenix is a 49 year old, presenting for the following:  Pre-Op Exam          6/25/2024     8:46 AM   Additional Questions   Roomed by mariya   Accompanied by na         6/25/2024     8:46 AM   Patient Reported Additional Medications   Patient reports taking the following new medications na     HPI related to upcoming procedure: vaginoplasty        6/25/2024   Pre-Op Questionnaire   Have you ever had a heart attack or stroke? No   Have you ever had surgery on your heart or blood vessels, such as a stent placement, a  coronary artery bypass, or surgery on an artery in your head, neck, heart, or legs? No   Do you have chest pain with activity? No   Do you have a history of heart failure? No   Do you currently have a cold, bronchitis or symptoms of other infection? No   Do you have a cough, shortness of breath, or wheezing? No   Do you or anyone in your family have previous history of blood clots? No   Do you or does anyone in your family have a serious bleeding problem such as prolonged bleeding following surgeries or cuts? No   Have you ever had problems with anemia or been told to take iron pills? No   Have you had any abnormal blood loss such as black, tarry or bloody stools, or abnormal vaginal bleeding? No   Have you ever had a blood transfusion? No   Are you willing to have a blood transfusion if it is medically needed before, during, or after your surgery? Yes   Have you or any of your relatives ever had problems with anesthesia? No   Do you have sleep apnea, excessive snoring or daytime drowsiness? No   Do you have any artifical heart valves or other implanted medical devices like a pacemaker, defibrillator, or continuous glucose monitor? No   Do you have artificial joints? No   Are you allergic to latex? (!) YES        Health Care Directive  Patient does not have a Health Care Directive or Living Will: Discussed advance care planning with patient; however, patient declined at this time.    Preoperative Review of    reviewed - no record of controlled substances prescribed.      Status of Chronic Conditions:  See problem list for active medical problems.  Problems all longstanding and stable, except as noted/documented.  See ROS for pertinent symptoms related to these conditions.    Patient Active Problem List    Diagnosis Date Noted    Recurrent major depressive disorder, in partial remission (H24) 01/15/2024     Priority: Medium    Gender dysphoria 06/23/2023     Priority: Medium    Male-to-female transgender person  "05/23/2022     Priority: Medium     Informed consent signed 6/20/2022  Has primary partner wife, ciswjr, is in polyamourous relationships, doesn't use condoms 100%, bottoms with partners with penis. Has a trans kid.  Start GAHT June 2022        No past medical history on file.  No past surgical history on file.  Current Outpatient Medications   Medication Sig Dispense Refill    escitalopram (LEXAPRO) 10 MG tablet 20 mg      spironolactone (ALDACTONE) 25 MG tablet Take TWO tabs in the morning PO and one tab before bed 270 tablet 1    estradiol (ESTRACE) 2 MG tablet Take 2 tablets (4 mg) by mouth 2 times daily 360 tablet 1       Allergies   Allergen Reactions    Zithromax [Azithromycin Dihydrate] Hives        Social History     Tobacco Use    Smoking status: Never     Passive exposure: Never    Smokeless tobacco: Never   Substance Use Topics    Alcohol use: Yes     Comment: 1 beer every 2 weeks     Family History   Problem Relation Age of Onset    Diabetes Mother     Cancer Father 76        brain    Diabetes Father      History   Drug Use    Types: Marijuana     Comment: smoke             Review of Systems  Constitutional, neuro, ENT, endocrine, pulmonary, cardiac, gastrointestinal, genitourinary, musculoskeletal, integument and psychiatric systems are negative, except as otherwise noted.    Objective    /70   Pulse 76   Temp 97.5  F (36.4  C) (Temporal)   Resp 16   Ht 1.74 m (5' 8.5\")   Wt 115.2 kg (254 lb)   SpO2 98%   BMI 38.06 kg/m     Estimated body mass index is 38.06 kg/m  as calculated from the following:    Height as of this encounter: 1.74 m (5' 8.5\").    Weight as of this encounter: 115.2 kg (254 lb).  Physical Exam  GENERAL: alert and no distress  EYES: Eyes grossly normal to inspection, PERRL and conjunctivae and sclerae normal  HENT: ear canals and TM's normal, nose and mouth without ulcers or lesions  NECK: no adenopathy, no asymmetry, masses, or scars  RESP: lungs clear to auscultation - " no rales, rhonchi or wheezes  CV: regular rate and rhythm, normal S1 S2, no S3 or S4, no murmur, click or rub, no peripheral edema  MS: no gross musculoskeletal defects noted, no edema    Recent Labs   Lab Test 01/15/24  0915      POTASSIUM 4.7   CR 0.88        Diagnostics  Recent Results (from the past 168 hour(s))   Hemoglobin    Collection Time: 06/25/24  9:29 AM   Result Value Ref Range    Hemoglobin 14.7 11.7 - 17.7 g/dL      No EKG required, no history of coronary heart disease, significant arrhythmia, peripheral arterial disease or other structural heart disease.    Revised Cardiac Risk Index (RCRI)  The patient has the following serious cardiovascular risks for perioperative complications:   - No serious cardiac risks = 0 points     RCRI Interpretation: 0 points: Class I (very low risk - 0.4% complication rate)         Signed Electronically by: JOHANNA Espinoza CNP  Copy of this evaluation report is provided to requesting physician.

## 2024-06-30 RX ORDER — ESTRADIOL 2 MG/1
4 TABLET ORAL 2 TIMES DAILY
Qty: 360 TABLET | Refills: 1 | Status: SHIPPED | OUTPATIENT
Start: 2024-06-30

## 2024-07-02 RX ORDER — SPIRONOLACTONE 25 MG/1
25 TABLET ORAL AT BEDTIME
Status: ON HOLD | COMMUNITY
End: 2024-07-08

## 2024-07-02 NOTE — PHARMACY-ADMISSION MEDICATION HISTORY
Pharmacy Intern Pre-operative Admission Medication History    Admission medication history was completed on July 2, 2024 in anticipation for upcoming surgical admission currently scheduled for 7/8. The information provided in this note is only as accurate as the sources available at the time of the update.  Pre-operative nursing staff should still review this list with patient for any changes or updates.     Information Source(s): Patient and CareEverywhere/SureScripts via phone    Pertinent Information:   Per pt, stopped taking estradiol and spironolactone on 7/1 due to stop taking 1 week prior to the surgery  spironolactone (ALDACTONE) 25 MG tablet  Per pt, she is planning to stop taking spironolactone after surgery.  Per pt, currently smoking cannabis flower PRN.   Per pt, it was recommended by doctor. But does not want to add it in medication list.    Changes made to PTA medication list:  Added:   spironolactone (ALDACTONE) 25 MG tablet Take 25 mg by mouth at bedtime  Deleted: None  Changed:   spironolactone (ALDACTONE) 25 MG tablet: Take TWO tabs in the morning PO and one tab before bed (Patient taking differently: Take 50 mg by mouth every morning)      Allergies reviewed with patient and updates made in EHR: yes    Medication History Completed By: Elana Navarrete 7/2/2024 1:55 PM    PTA Med List   Medication Sig Last Dose    escitalopram (LEXAPRO) 20 MG tablet Take 20 mg by mouth daily     estradiol (ESTRACE) 2 MG tablet Take 2 tablets (4 mg) by mouth 2 times daily 7/1/2024 at am    spironolactone (ALDACTONE) 25 MG tablet Take 25 mg by mouth at bedtime 7/1/2024 at bedtime    spironolactone (ALDACTONE) 25 MG tablet Take TWO tabs in the morning PO and one tab before bed (Patient taking differently: Take 50 mg by mouth every morning) 7/1/2024 at am

## 2024-07-07 ENCOUNTER — ANESTHESIA EVENT (OUTPATIENT)
Dept: SURGERY | Facility: CLINIC | Age: 49
DRG: 876 | End: 2024-07-07
Payer: COMMERCIAL

## 2024-07-08 ENCOUNTER — HOSPITAL ENCOUNTER (INPATIENT)
Facility: CLINIC | Age: 49
LOS: 2 days | Discharge: HOME | DRG: 876 | End: 2024-07-10
Attending: UROLOGY | Admitting: UROLOGY
Payer: COMMERCIAL

## 2024-07-08 ENCOUNTER — ANESTHESIA (OUTPATIENT)
Dept: SURGERY | Facility: CLINIC | Age: 49
DRG: 876 | End: 2024-07-08
Payer: COMMERCIAL

## 2024-07-08 DIAGNOSIS — F64.9 GENDER DYSPHORIA: Primary | ICD-10-CM

## 2024-07-08 LAB — GLUCOSE BLDC GLUCOMTR-MCNC: 104 MG/DL (ref 70–99)

## 2024-07-08 PROCEDURE — 258N000003 HC RX IP 258 OP 636: Performed by: ANESTHESIOLOGY

## 2024-07-08 PROCEDURE — 53410 RECONSTRUCTION OF URETHRA: CPT | Mod: KX | Performed by: UROLOGY

## 2024-07-08 PROCEDURE — 250N000011 HC RX IP 250 OP 636: Performed by: STUDENT IN AN ORGANIZED HEALTH CARE EDUCATION/TRAINING PROGRAM

## 2024-07-08 PROCEDURE — 88302 TISSUE EXAM BY PATHOLOGIST: CPT | Mod: TC | Performed by: UROLOGY

## 2024-07-08 PROCEDURE — 250N000025 HC SEVOFLURANE, PER MIN: Performed by: UROLOGY

## 2024-07-08 PROCEDURE — 14301 TIS TRNFR ANY 30.1-60 SQ CM: CPT | Mod: KX | Performed by: UROLOGY

## 2024-07-08 PROCEDURE — 250N000013 HC RX MED GY IP 250 OP 250 PS 637: Performed by: ANESTHESIOLOGY

## 2024-07-08 PROCEDURE — 710N000010 HC RECOVERY PHASE 1, LEVEL 2, PER MIN: Performed by: UROLOGY

## 2024-07-08 PROCEDURE — 250N000009 HC RX 250: Performed by: ANESTHESIOLOGY

## 2024-07-08 PROCEDURE — 0VTC0ZZ RESECTION OF BILATERAL TESTES, OPEN APPROACH: ICD-10-PCS | Performed by: UROLOGY

## 2024-07-08 PROCEDURE — 250N000011 HC RX IP 250 OP 636: Performed by: UROLOGY

## 2024-07-08 PROCEDURE — 56805 CLITOROPLASTY INTERSEX STATE: CPT | Performed by: REGISTERED NURSE

## 2024-07-08 PROCEDURE — 56805 CLITOROPLASTY INTERSEX STATE: CPT | Mod: KX | Performed by: UROLOGY

## 2024-07-08 PROCEDURE — 54125 REMOVAL OF PENIS: CPT | Mod: KX | Performed by: UROLOGY

## 2024-07-08 PROCEDURE — 258N000003 HC RX IP 258 OP 636: Performed by: STUDENT IN AN ORGANIZED HEALTH CARE EDUCATION/TRAINING PROGRAM

## 2024-07-08 PROCEDURE — 120N000002 HC R&B MED SURG/OB UMMC

## 2024-07-08 PROCEDURE — 370N000017 HC ANESTHESIA TECHNICAL FEE, PER MIN: Performed by: UROLOGY

## 2024-07-08 PROCEDURE — 360N000078 HC SURGERY LEVEL 5, PER MIN: Performed by: UROLOGY

## 2024-07-08 PROCEDURE — 250N000009 HC RX 250: Performed by: UROLOGY

## 2024-07-08 PROCEDURE — 250N000011 HC RX IP 250 OP 636: Performed by: ANESTHESIOLOGY

## 2024-07-08 PROCEDURE — 999N000141 HC STATISTIC PRE-PROCEDURE NURSING ASSESSMENT: Performed by: UROLOGY

## 2024-07-08 PROCEDURE — 54520 REMOVAL OF TESTIS: CPT | Mod: 50 | Performed by: UROLOGY

## 2024-07-08 PROCEDURE — 272N000001 HC OR GENERAL SUPPLY STERILE: Performed by: UROLOGY

## 2024-07-08 PROCEDURE — 57292 CONSTRUCT VAGINA WITH GRAFT: CPT | Mod: KX | Performed by: UROLOGY

## 2024-07-08 PROCEDURE — 250N000013 HC RX MED GY IP 250 OP 250 PS 637: Performed by: STUDENT IN AN ORGANIZED HEALTH CARE EDUCATION/TRAINING PROGRAM

## 2024-07-08 PROCEDURE — 0W4M070 CREATION OF VAGINA IN MALE PERINEUM WITH AUTOLOGOUS TISSUE SUBSTITUTE, OPEN APPROACH: ICD-10-PCS | Performed by: UROLOGY

## 2024-07-08 PROCEDURE — 88302 TISSUE EXAM BY PATHOLOGIST: CPT | Mod: 26 | Performed by: PATHOLOGY

## 2024-07-08 PROCEDURE — 56805 CLITOROPLASTY INTERSEX STATE: CPT | Performed by: ANESTHESIOLOGY

## 2024-07-08 PROCEDURE — 55150 REMOVAL OF SCROTUM: CPT | Mod: KX | Performed by: UROLOGY

## 2024-07-08 RX ORDER — LIDOCAINE 40 MG/G
CREAM TOPICAL
Status: DISCONTINUED | OUTPATIENT
Start: 2024-07-08 | End: 2024-07-10 | Stop reason: HOSPADM

## 2024-07-08 RX ORDER — BUPIVACAINE HYDROCHLORIDE AND EPINEPHRINE 2.5; 5 MG/ML; UG/ML
INJECTION, SOLUTION INFILTRATION; PERINEURAL PRN
Status: DISCONTINUED | OUTPATIENT
Start: 2024-07-08 | End: 2024-07-08 | Stop reason: HOSPADM

## 2024-07-08 RX ORDER — ONDANSETRON 4 MG/1
4 TABLET, ORALLY DISINTEGRATING ORAL EVERY 6 HOURS PRN
Status: DISCONTINUED | OUTPATIENT
Start: 2024-07-08 | End: 2024-07-10 | Stop reason: HOSPADM

## 2024-07-08 RX ORDER — FENTANYL CITRATE 50 UG/ML
25 INJECTION, SOLUTION INTRAMUSCULAR; INTRAVENOUS EVERY 5 MIN PRN
Status: DISCONTINUED | OUTPATIENT
Start: 2024-07-08 | End: 2024-07-08 | Stop reason: HOSPADM

## 2024-07-08 RX ORDER — NALOXONE HYDROCHLORIDE 0.4 MG/ML
0.4 INJECTION, SOLUTION INTRAMUSCULAR; INTRAVENOUS; SUBCUTANEOUS
Status: DISCONTINUED | OUTPATIENT
Start: 2024-07-08 | End: 2024-07-10 | Stop reason: HOSPADM

## 2024-07-08 RX ORDER — BISACODYL 10 MG
10 SUPPOSITORY, RECTAL RECTAL DAILY PRN
Status: DISCONTINUED | OUTPATIENT
Start: 2024-07-11 | End: 2024-07-10 | Stop reason: HOSPADM

## 2024-07-08 RX ORDER — POLYETHYLENE GLYCOL 3350 17 G/17G
17 POWDER, FOR SOLUTION ORAL DAILY
Status: DISCONTINUED | OUTPATIENT
Start: 2024-07-09 | End: 2024-07-10 | Stop reason: HOSPADM

## 2024-07-08 RX ORDER — ESCITALOPRAM OXALATE 10 MG/1
20 TABLET ORAL DAILY
Status: DISCONTINUED | OUTPATIENT
Start: 2024-07-09 | End: 2024-07-10 | Stop reason: HOSPADM

## 2024-07-08 RX ORDER — FENTANYL CITRATE 50 UG/ML
INJECTION, SOLUTION INTRAMUSCULAR; INTRAVENOUS PRN
Status: DISCONTINUED | OUTPATIENT
Start: 2024-07-08 | End: 2024-07-08

## 2024-07-08 RX ORDER — CEFAZOLIN SODIUM/WATER 2 G/20 ML
2 SYRINGE (ML) INTRAVENOUS
Status: COMPLETED | OUTPATIENT
Start: 2024-07-08 | End: 2024-07-08

## 2024-07-08 RX ORDER — ONDANSETRON 2 MG/ML
4 INJECTION INTRAMUSCULAR; INTRAVENOUS EVERY 30 MIN PRN
Status: DISCONTINUED | OUTPATIENT
Start: 2024-07-08 | End: 2024-07-08 | Stop reason: HOSPADM

## 2024-07-08 RX ORDER — OXYCODONE HYDROCHLORIDE 5 MG/1
5 TABLET ORAL EVERY 4 HOURS PRN
Status: DISCONTINUED | OUTPATIENT
Start: 2024-07-08 | End: 2024-07-10 | Stop reason: HOSPADM

## 2024-07-08 RX ORDER — ACETAMINOPHEN 325 MG/1
975 TABLET ORAL ONCE
Status: COMPLETED | OUTPATIENT
Start: 2024-07-08 | End: 2024-07-08

## 2024-07-08 RX ORDER — HYDROMORPHONE HYDROCHLORIDE 1 MG/ML
0.4 INJECTION, SOLUTION INTRAMUSCULAR; INTRAVENOUS; SUBCUTANEOUS
Status: DISCONTINUED | OUTPATIENT
Start: 2024-07-08 | End: 2024-07-10 | Stop reason: HOSPADM

## 2024-07-08 RX ORDER — DEXAMETHASONE SODIUM PHOSPHATE 4 MG/ML
4 INJECTION, SOLUTION INTRA-ARTICULAR; INTRALESIONAL; INTRAMUSCULAR; INTRAVENOUS; SOFT TISSUE
Status: DISCONTINUED | OUTPATIENT
Start: 2024-07-08 | End: 2024-07-08 | Stop reason: HOSPADM

## 2024-07-08 RX ORDER — ACETAMINOPHEN 325 MG/1
650 TABLET ORAL EVERY 6 HOURS
Status: DISCONTINUED | OUTPATIENT
Start: 2024-07-08 | End: 2024-07-10 | Stop reason: HOSPADM

## 2024-07-08 RX ORDER — ONDANSETRON 4 MG/1
4 TABLET, ORALLY DISINTEGRATING ORAL EVERY 30 MIN PRN
Status: DISCONTINUED | OUTPATIENT
Start: 2024-07-08 | End: 2024-07-08 | Stop reason: HOSPADM

## 2024-07-08 RX ORDER — LABETALOL HYDROCHLORIDE 5 MG/ML
10 INJECTION, SOLUTION INTRAVENOUS
Status: DISCONTINUED | OUTPATIENT
Start: 2024-07-08 | End: 2024-07-08 | Stop reason: HOSPADM

## 2024-07-08 RX ORDER — ONDANSETRON 2 MG/ML
INJECTION INTRAMUSCULAR; INTRAVENOUS PRN
Status: DISCONTINUED | OUTPATIENT
Start: 2024-07-08 | End: 2024-07-08

## 2024-07-08 RX ORDER — PROCHLORPERAZINE MALEATE 10 MG
10 TABLET ORAL EVERY 6 HOURS PRN
Status: DISCONTINUED | OUTPATIENT
Start: 2024-07-08 | End: 2024-07-10 | Stop reason: HOSPADM

## 2024-07-08 RX ORDER — OXYCODONE HYDROCHLORIDE 10 MG/1
10 TABLET ORAL EVERY 4 HOURS PRN
Status: DISCONTINUED | OUTPATIENT
Start: 2024-07-08 | End: 2024-07-10 | Stop reason: HOSPADM

## 2024-07-08 RX ORDER — SODIUM CHLORIDE, SODIUM LACTATE, POTASSIUM CHLORIDE, CALCIUM CHLORIDE 600; 310; 30; 20 MG/100ML; MG/100ML; MG/100ML; MG/100ML
INJECTION, SOLUTION INTRAVENOUS CONTINUOUS PRN
Status: DISCONTINUED | OUTPATIENT
Start: 2024-07-08 | End: 2024-07-08

## 2024-07-08 RX ORDER — HEPARIN SODIUM 5000 [USP'U]/.5ML
5000 INJECTION, SOLUTION INTRAVENOUS; SUBCUTANEOUS
Status: COMPLETED | OUTPATIENT
Start: 2024-07-08 | End: 2024-07-08

## 2024-07-08 RX ORDER — DIAZEPAM 10 MG/2ML
2.5 INJECTION, SOLUTION INTRAMUSCULAR; INTRAVENOUS
Status: DISCONTINUED | OUTPATIENT
Start: 2024-07-08 | End: 2024-07-08 | Stop reason: HOSPADM

## 2024-07-08 RX ORDER — NALOXONE HYDROCHLORIDE 0.4 MG/ML
0.2 INJECTION, SOLUTION INTRAMUSCULAR; INTRAVENOUS; SUBCUTANEOUS
Status: DISCONTINUED | OUTPATIENT
Start: 2024-07-08 | End: 2024-07-10 | Stop reason: HOSPADM

## 2024-07-08 RX ORDER — HYDROMORPHONE HYDROCHLORIDE 1 MG/ML
0.2 INJECTION, SOLUTION INTRAMUSCULAR; INTRAVENOUS; SUBCUTANEOUS
Status: DISCONTINUED | OUTPATIENT
Start: 2024-07-08 | End: 2024-07-10 | Stop reason: HOSPADM

## 2024-07-08 RX ORDER — CEFAZOLIN SODIUM/WATER 2 G/20 ML
2 SYRINGE (ML) INTRAVENOUS SEE ADMIN INSTRUCTIONS
Status: DISCONTINUED | OUTPATIENT
Start: 2024-07-08 | End: 2024-07-08 | Stop reason: HOSPADM

## 2024-07-08 RX ORDER — GLYCOPYRROLATE 0.2 MG/ML
INJECTION, SOLUTION INTRAMUSCULAR; INTRAVENOUS PRN
Status: DISCONTINUED | OUTPATIENT
Start: 2024-07-08 | End: 2024-07-08

## 2024-07-08 RX ORDER — ONDANSETRON 2 MG/ML
4 INJECTION INTRAMUSCULAR; INTRAVENOUS EVERY 6 HOURS PRN
Status: DISCONTINUED | OUTPATIENT
Start: 2024-07-08 | End: 2024-07-10 | Stop reason: HOSPADM

## 2024-07-08 RX ORDER — HYDROMORPHONE HYDROCHLORIDE 1 MG/ML
0.2 INJECTION, SOLUTION INTRAMUSCULAR; INTRAVENOUS; SUBCUTANEOUS EVERY 5 MIN PRN
Status: DISCONTINUED | OUTPATIENT
Start: 2024-07-08 | End: 2024-07-08 | Stop reason: HOSPADM

## 2024-07-08 RX ORDER — HYDROMORPHONE HYDROCHLORIDE 1 MG/ML
0.4 INJECTION, SOLUTION INTRAMUSCULAR; INTRAVENOUS; SUBCUTANEOUS EVERY 5 MIN PRN
Status: DISCONTINUED | OUTPATIENT
Start: 2024-07-08 | End: 2024-07-08 | Stop reason: HOSPADM

## 2024-07-08 RX ORDER — OXYCODONE HYDROCHLORIDE 10 MG/1
10 TABLET ORAL
Status: DISCONTINUED | OUTPATIENT
Start: 2024-07-08 | End: 2024-07-08 | Stop reason: HOSPADM

## 2024-07-08 RX ORDER — SCOLOPAMINE TRANSDERMAL SYSTEM 1 MG/1
1 PATCH, EXTENDED RELEASE TRANSDERMAL ONCE
Status: DISCONTINUED | OUTPATIENT
Start: 2024-07-08 | End: 2024-07-08 | Stop reason: HOSPADM

## 2024-07-08 RX ORDER — ACETAMINOPHEN 325 MG/1
650 TABLET ORAL EVERY 4 HOURS PRN
Status: DISCONTINUED | OUTPATIENT
Start: 2024-07-11 | End: 2024-07-10 | Stop reason: HOSPADM

## 2024-07-08 RX ORDER — CEFAZOLIN SODIUM 2 G/100ML
2 INJECTION, SOLUTION INTRAVENOUS EVERY 8 HOURS
Status: DISCONTINUED | OUTPATIENT
Start: 2024-07-08 | End: 2024-07-10 | Stop reason: HOSPADM

## 2024-07-08 RX ORDER — SODIUM CHLORIDE 9 MG/ML
INJECTION, SOLUTION INTRAVENOUS CONTINUOUS
Status: DISCONTINUED | OUTPATIENT
Start: 2024-07-08 | End: 2024-07-09

## 2024-07-08 RX ORDER — PROPOFOL 10 MG/ML
INJECTION, EMULSION INTRAVENOUS CONTINUOUS PRN
Status: DISCONTINUED | OUTPATIENT
Start: 2024-07-08 | End: 2024-07-08

## 2024-07-08 RX ORDER — AMOXICILLIN 250 MG
1 CAPSULE ORAL 2 TIMES DAILY
Status: DISCONTINUED | OUTPATIENT
Start: 2024-07-08 | End: 2024-07-10 | Stop reason: HOSPADM

## 2024-07-08 RX ORDER — DEXAMETHASONE SODIUM PHOSPHATE 4 MG/ML
INJECTION, SOLUTION INTRA-ARTICULAR; INTRALESIONAL; INTRAMUSCULAR; INTRAVENOUS; SOFT TISSUE PRN
Status: DISCONTINUED | OUTPATIENT
Start: 2024-07-08 | End: 2024-07-08

## 2024-07-08 RX ORDER — TOLTERODINE 4 MG/1
4 CAPSULE, EXTENDED RELEASE ORAL DAILY
Status: COMPLETED | OUTPATIENT
Start: 2024-07-08 | End: 2024-07-09

## 2024-07-08 RX ORDER — LIDOCAINE HYDROCHLORIDE 20 MG/ML
INJECTION, SOLUTION INFILTRATION; PERINEURAL PRN
Status: DISCONTINUED | OUTPATIENT
Start: 2024-07-08 | End: 2024-07-08

## 2024-07-08 RX ORDER — PROPOFOL 10 MG/ML
INJECTION, EMULSION INTRAVENOUS PRN
Status: DISCONTINUED | OUTPATIENT
Start: 2024-07-08 | End: 2024-07-08

## 2024-07-08 RX ORDER — OXYCODONE HYDROCHLORIDE 5 MG/1
5 TABLET ORAL
Status: COMPLETED | OUTPATIENT
Start: 2024-07-08 | End: 2024-07-08

## 2024-07-08 RX ORDER — NALOXONE HYDROCHLORIDE 0.4 MG/ML
0.1 INJECTION, SOLUTION INTRAMUSCULAR; INTRAVENOUS; SUBCUTANEOUS
Status: DISCONTINUED | OUTPATIENT
Start: 2024-07-08 | End: 2024-07-08 | Stop reason: HOSPADM

## 2024-07-08 RX ORDER — SODIUM CHLORIDE, SODIUM LACTATE, POTASSIUM CHLORIDE, CALCIUM CHLORIDE 600; 310; 30; 20 MG/100ML; MG/100ML; MG/100ML; MG/100ML
INJECTION, SOLUTION INTRAVENOUS CONTINUOUS
Status: DISCONTINUED | OUTPATIENT
Start: 2024-07-08 | End: 2024-07-08 | Stop reason: HOSPADM

## 2024-07-08 RX ORDER — FENTANYL CITRATE 50 UG/ML
50 INJECTION, SOLUTION INTRAMUSCULAR; INTRAVENOUS EVERY 5 MIN PRN
Status: DISCONTINUED | OUTPATIENT
Start: 2024-07-08 | End: 2024-07-08 | Stop reason: HOSPADM

## 2024-07-08 RX ADMIN — DEXAMETHASONE SODIUM PHOSPHATE 8 MG: 4 INJECTION, SOLUTION INTRA-ARTICULAR; INTRALESIONAL; INTRAMUSCULAR; INTRAVENOUS; SOFT TISSUE at 12:18

## 2024-07-08 RX ADMIN — ACETAMINOPHEN 975 MG: 325 TABLET, FILM COATED ORAL at 16:37

## 2024-07-08 RX ADMIN — Medication 2 G: at 12:08

## 2024-07-08 RX ADMIN — DEXMEDETOMIDINE HYDROCHLORIDE 8 MCG: 100 INJECTION, SOLUTION INTRAVENOUS at 12:35

## 2024-07-08 RX ADMIN — HYDROMORPHONE HYDROCHLORIDE 0.2 MG: 1 INJECTION, SOLUTION INTRAMUSCULAR; INTRAVENOUS; SUBCUTANEOUS at 16:19

## 2024-07-08 RX ADMIN — HEPARIN SODIUM 5000 UNITS: 5000 INJECTION, SOLUTION INTRAVENOUS; SUBCUTANEOUS at 11:13

## 2024-07-08 RX ADMIN — FENTANYL CITRATE 50 MCG: 50 INJECTION INTRAMUSCULAR; INTRAVENOUS at 12:20

## 2024-07-08 RX ADMIN — FENTANYL CITRATE 50 MCG: 50 INJECTION INTRAMUSCULAR; INTRAVENOUS at 15:45

## 2024-07-08 RX ADMIN — HYDROMORPHONE HYDROCHLORIDE 0.2 MG: 1 INJECTION, SOLUTION INTRAMUSCULAR; INTRAVENOUS; SUBCUTANEOUS at 19:57

## 2024-07-08 RX ADMIN — TOLTERODINE 4 MG: 4 CAPSULE, EXTENDED RELEASE ORAL at 18:36

## 2024-07-08 RX ADMIN — SENNOSIDES AND DOCUSATE SODIUM 1 TABLET: 50; 8.6 TABLET ORAL at 19:57

## 2024-07-08 RX ADMIN — LIDOCAINE HYDROCHLORIDE 100 MG: 20 INJECTION, SOLUTION INFILTRATION; PERINEURAL at 12:04

## 2024-07-08 RX ADMIN — OXYCODONE HYDROCHLORIDE 5 MG: 5 TABLET ORAL at 16:37

## 2024-07-08 RX ADMIN — DEXMEDETOMIDINE HYDROCHLORIDE 4 MCG: 100 INJECTION, SOLUTION INTRAVENOUS at 12:59

## 2024-07-08 RX ADMIN — Medication 50 MG: at 12:05

## 2024-07-08 RX ADMIN — SODIUM CHLORIDE, POTASSIUM CHLORIDE, SODIUM LACTATE AND CALCIUM CHLORIDE: 600; 310; 30; 20 INJECTION, SOLUTION INTRAVENOUS at 15:37

## 2024-07-08 RX ADMIN — DEXMEDETOMIDINE HYDROCHLORIDE 4 MCG: 100 INJECTION, SOLUTION INTRAVENOUS at 13:20

## 2024-07-08 RX ADMIN — ONDANSETRON 4 MG: 2 INJECTION INTRAMUSCULAR; INTRAVENOUS at 15:23

## 2024-07-08 RX ADMIN — DEXMEDETOMIDINE HYDROCHLORIDE 4 MCG: 100 INJECTION, SOLUTION INTRAVENOUS at 13:03

## 2024-07-08 RX ADMIN — HYDROMORPHONE HYDROCHLORIDE 0.5 MG: 1 INJECTION, SOLUTION INTRAMUSCULAR; INTRAVENOUS; SUBCUTANEOUS at 13:01

## 2024-07-08 RX ADMIN — SUGAMMADEX 200 MG: 100 INJECTION, SOLUTION INTRAVENOUS at 15:37

## 2024-07-08 RX ADMIN — SODIUM CHLORIDE, POTASSIUM CHLORIDE, SODIUM LACTATE AND CALCIUM CHLORIDE: 600; 310; 30; 20 INJECTION, SOLUTION INTRAVENOUS at 11:53

## 2024-07-08 RX ADMIN — HYDROMORPHONE HYDROCHLORIDE 0.5 MG: 1 INJECTION, SOLUTION INTRAMUSCULAR; INTRAVENOUS; SUBCUTANEOUS at 14:12

## 2024-07-08 RX ADMIN — GLYCOPYRROLATE 0.2 MG: 0.2 INJECTION, SOLUTION INTRAMUSCULAR; INTRAVENOUS at 12:54

## 2024-07-08 RX ADMIN — FENTANYL CITRATE 50 MCG: 50 INJECTION INTRAMUSCULAR; INTRAVENOUS at 12:35

## 2024-07-08 RX ADMIN — ACETAMINOPHEN 650 MG: 325 TABLET, FILM COATED ORAL at 21:52

## 2024-07-08 RX ADMIN — PROPOFOL 30 MG: 10 INJECTION, EMULSION INTRAVENOUS at 13:00

## 2024-07-08 RX ADMIN — PROPOFOL 40 MCG/KG/MIN: 10 INJECTION, EMULSION INTRAVENOUS at 13:45

## 2024-07-08 RX ADMIN — PROPOFOL 50 MCG/KG/MIN: 10 INJECTION, EMULSION INTRAVENOUS at 12:07

## 2024-07-08 RX ADMIN — MIDAZOLAM 2 MG: 1 INJECTION INTRAMUSCULAR; INTRAVENOUS at 11:56

## 2024-07-08 RX ADMIN — OXYCODONE HYDROCHLORIDE 5 MG: 5 TABLET ORAL at 23:21

## 2024-07-08 RX ADMIN — HYDROMORPHONE HYDROCHLORIDE 0.5 MG: 1 INJECTION, SOLUTION INTRAMUSCULAR; INTRAVENOUS; SUBCUTANEOUS at 14:25

## 2024-07-08 RX ADMIN — CEFAZOLIN SODIUM 2 G: 2 INJECTION, SOLUTION INTRAVENOUS at 19:57

## 2024-07-08 RX ADMIN — SODIUM CHLORIDE: 9 INJECTION, SOLUTION INTRAVENOUS at 17:55

## 2024-07-08 RX ADMIN — PROPOFOL 200 MG: 10 INJECTION, EMULSION INTRAVENOUS at 12:04

## 2024-07-08 ASSESSMENT — ACTIVITIES OF DAILY LIVING (ADL)
ADLS_ACUITY_SCORE: 20

## 2024-07-08 NOTE — PROGRESS NOTES
6MS ADMISSION    D: Patient admitted/transferred from [facility] via  cart for Post surgery recovery    I: Upon arrival to the unit patient was oriented to room, unit, and call light. Patient s height, weight, and vital signs were obtained. Allergies reviewed and allergy band applied. Provider notified of patient s arrival on the unit. Adult AVS completed. Head to toe assessment completed. Education assessment completed. Care plan initiated.    A: Vital signs stable upon admission. Patient rates pain at 2 Two RN skin assessment completed yes . Second RN was Rodger Significant Skin Findings include Dressing on the perineal area clean dry intact  WOC Nurse Consult Ordered NO. Was a bariatric bed frame ordered No. Was an air pump added to the Isoflex mattress No    P: Continue to monitor patient s Vitals and Pain , incision site and intervene as needed. Continue with plan of care. Notify provider with any concerns or changes in patient status.

## 2024-07-08 NOTE — BRIEF OP NOTE
[facility]    Brief Operative Note    Pre-operative diagnosis: Gender dysphoria [F64.9]  Post-operative diagnosis Same as pre-operative diagnosis    Procedure: Minimal Depth Vaginoplasty, N/A - Vulva    Surgeon: Surgeons and Role:     * Viraj Dacosta MD - Primary     * Liana Castro MD - Resident - Assisting  Anesthesia: General   Estimated Blood Loss: 250 mL from 7/8/2024 11:57 AM to 7/8/2024  3:47 PM      Drains: 16 Fr Witt and 15 Fr drain x 2 in labia, Small Packing in Vagina   Specimens:   ID Type Source Tests Collected by Time Destination   1 : Testis left and right Tissue Testis, Right SURGICAL PATHOLOGY EXAM Viraj Dacosta MD 7/8/2024  1:01 PM    2 : Specimen: Penis and Urethra Tissue Penis SURGICAL PATHOLOGY EXAM Viraj Dacosta MD 7/8/2024  1:44 PM    4 : Specimen: Scrotum Left and Right Tissue Scrotum SURGICAL PATHOLOGY EXAM Viraj Dacosta MD 7/8/2024  2:32 PM      Findings: Bilateral testicles removed. Case without unusual findings. .  Complications: None.  Implants: * No implants in log *    Post-op plan:   - admit to urology for observation   - regular diet, IVF, bowel regimen  - periop abx with Ancef  - Detrol while catheter in place  - dressing removal, drain removal, and TOV on POD#2

## 2024-07-08 NOTE — OP NOTE
OPERATIVE REPORT     PREOPERATIVE DIAGNOSIS: Gender Dysphoria  POSTOPERATIVE DIAGNOSIS: Gender Dysphoria  DATE OF SURGERY: July 8, 2024     PROCEDURE:   Penile Inversion Minimal Depth Vaginoplasty which includes:  1. Orchiectomy   2. Clitoroplasty   3. Total Penectomy  4. Urethroplasty   5. Construction of Minimal Depth Artificial Vagina   6. Scrotectomy  7. Bilateral labiaplasty via adjacent tissue transfer of scrotal and mons skin flaps, size 12 x 5 cm on left and 12 x 5 cm on right.       SURGEON: Viraj Dacosta MD  RESIDENT: Liana Castro MD  ANESTHESIA: General  EBL: 250cc  DRAINS: 16 Fr Witt and 15 Fr drain x 2 in labia, Small Packing in Vagina       INDICATIONS:   Phoenix M Stremski is a 49 year old year old transgender female with gender dysphoria. She meets WPATH guidelines for gender affirming surgery. She elects for minimal depth vaginoplasty. She has not had prior orchiectomy. The risks, benefits and alternatives of the multiple treatment options were discussed with her and she elected to proceed. Specifically, this included, but was not limited to: wound infection, anesthesia risks, blood clots, urethral stricture, inadequate vaginal depth, and rectal injury.      DESCRIPTION OF PROCEDURE: After informed consent was obtained and preoperative antibiotics were given, the patient was taken to the operating room, placed supine on the operating table. SCDs and preoperative subcutaneous heparin were utilized for VTE prophylaxis. General anesthesia was induced and she was intubated. The patient was placed in lithotomy. The genitalia and lower abdomen were prepped and draped in a sterile fashion. A timeout was performed.     We then made a circumcising skin incision with care to leave a mucosal collar beneath the glans. This was dissected down to Jaime's fascia. The penis was degloved, which left a circumferential penile skin tube and the penis was brought through this.     We made a midline scrotal  incision, which was taken down to the level of the urethra. We performed bilateral orchiectomy. Left testicle was dissected to inguinal ring using cautery. Cord was split in half. Each half was tied with 0 silk suture ligature. Entire stump was also tied with 0 silk suture ligature. Same was repeated on right testicle. Both testicles were passed off as specimens. This completed bilateral orchiectomy. Of note, both cords had spermatic cord lipomas present which caused the cords to be thickened. We opened and examined both to ensure that no hernia sac contents were present.      We then turned our attention to the glans in order to perform a clitoroplasty. A W-shaped incision was marked along the glans to create the clitoris. Bovie cautery was used to delineate the lateral borders of the neurovascular bundle along the penile shaft. A combination of blunt and sharp dissection with tenotomy scissors was used to dissect the penile glans and its neurovascular bundle. Dissection was performed along the under surface of the tunica albuginea - freeing the corporal spongy tissue. This ensured that the neurovascular bundle was left unharmed. The neoclitoris was then shaped and sutured in a narrow tubular stricture and the mucosal collar was shaped to form a clitoral miranda.     After clitoroplasty, the anterior urethra was freed from the corporal bodies. Circumferential mobilization was performed to the distal bulbar urethra using sharp dissection. We then dissected the cavernosal bodies to the level of the pubic symphysis and then amputated the corporal bodies bilaterally at this level. The specimen was then passed off to pathology. The stumps were closed with running PDS suture.    Dissection was continued around the bulbospongiosus muscle to identify the ischiocavernosal muscles. The bulbospongiosus muscle was sharply taken off the bulbar urethra and laterally, and left intact posteriorly. At this point, sharp dissection was  continued along the base of the bulbar urethra. The central tendon was released, which allowed for urethral mobility.       Using sharp dissection a space was developed between the urethra and prostate anteriorly and the rectum posteriorly. We stopped after division of the central tendon and some infraprostatic dissection to allow for an internalized vaginal canal without significant depth. The neurovascular bundle was then folded such that the neoclitoris was positioned on the pubis. The clitoris was tacked in position using absorbable suture.      We then turned our attention to completing the urethroplasty. The urethra was transected at the level of the distal bulbar urethra. The urethra was spatulated for about 4cm ventrally, allowing the dorsal aspect to be sewn to the neoclitoral and clitoral miranda skin dorsally using 3-0 Vicryl interrupted suture - thus creating a mucosal, lubricated inner vulvar vestibule. The urethral spatulation ended at the distal bulbar urethra.     The tip of the vaginal tube was closed using absorbable suture and sutured into place at the depth of our vaginal dissection with multiple absorbable interrupted sutures. We placed PDS sutures to tack the skin tube to its appropriate location.    We then turned our attention to creation of a midline opening superior to the penile skin tube to become the opening for the clitoris and urethra. The ventral urethra was then sutured to the posterior aspect of the superiorly placed opening to complete the urethroplasty using 4-0 Vicryl suture. There was excellent urethral mucosal eversion with pink mucosa evident. The clitoral miranda was also brought forward through this incision and sutured into place.    A drain was placed underneath each labia majora for two drains total, and each drain sutured in place with a 3-0 nylon. Next, we created labia minora by placing parallel incisions alongside the introitus and placing plicating sutures using 3-0 PDS. I  designed bilateral labia majora using anteriorly perfused scrotal and mons skin. Based on this location, I marked out the areas of scrotum that would need to be resected. I then performed excision of the excess scrotal tissue. These were two separate segments. The labial flaps were 12x5cm per side. These were fashioned to create a feminine appearing labia. They were advanced posteriorly and sutured into place using 3-0 Vicryl and Monocryl sutures in two layers.     A Witt catheter was placed into the urethra per the neomeatus, with 10cc in the balloon, draining clear yellow urine. Xeroform packing was placed in the vagina (2 pieces total). A pressure dressing was applied and sutured in place using Prolene sutures. The clitoris was pink and viable at the end of the case. All counts were correct at the end of the case. The patient was then extubated, awakened, and transferred to the postop area in stable condition.    Post-op plan:   - admit to urology for observation   - regular diet, IVF, bowel regimen  - periop abx with Ancef  - Detrol while catheter in place  - dressing removal, drain removal, and TOV on POD#2    As attending surgeon, I, Viraj Dacosta MD, was scrubbed and present for the entire procedure.

## 2024-07-08 NOTE — ANESTHESIA POSTPROCEDURE EVALUATION
Patient: Phoenix M Stremski    Procedure: Procedure(s):  Minimal Depth Vaginoplasty       Anesthesia Type:  General    Note:  Disposition: Inpatient   Postop Pain Control: Uneventful            Sign Out: Well controlled pain   PONV: No   Neuro/Psych: Uneventful            Sign Out: Acceptable/Baseline neuro status   Airway/Respiratory: Uneventful            Sign Out: Acceptable/Baseline resp. status   CV/Hemodynamics: Uneventful            Sign Out: Acceptable CV status; No obvious hypovolemia; No obvious fluid overload   Other NRE: NONE   DID A NON-ROUTINE EVENT OCCUR? No       Last vitals:  Vitals Value Taken Time   /69 07/08/24 1600   Temp 36.6  C (97.9  F) 07/08/24 1552   Pulse 85 07/08/24 1611   Resp 9 07/08/24 1611   SpO2 97 % 07/08/24 1611   Vitals shown include unfiled device data.    Electronically Signed By: Nabila Huang MD  July 8, 2024  4:12 PM

## 2024-07-08 NOTE — ANESTHESIA CARE TRANSFER NOTE
Patient: Phoenix M Stremski    Procedure: Procedure(s):  Minimal Depth Vaginoplasty       Diagnosis: Gender dysphoria [F64.9]  Diagnosis Additional Information: No value filed.    Anesthesia Type:   General     Note:    Oropharynx: oropharynx clear of all foreign objects and spontaneously breathing  Level of Consciousness: drowsy  Oxygen Supplementation: face mask  Level of Supplemental Oxygen (L/min / FiO2): 8  Independent Airway: airway patency satisfactory and stable  Dentition: dentition unchanged  Vital Signs Stable: post-procedure vital signs reviewed and stable  Report to RN Given: handoff report given  Patient transferred to: PACU    Handoff Report: Identifed the Patient, Identified the Reponsible Provider, Reviewed the pertinent medical history, Discussed the surgical course, Reviewed Intra-OP anesthesia mangement and issues during anesthesia, Set expectations for post-procedure period and Allowed opportunity for questions and acknowledgement of understanding      Vitals:  Vitals Value Taken Time   /67 07/08/24 1552   Temp 36.6 C 07/08/24 1552   Pulse 77 07/08/24 1556   Resp 21 07/08/24 1556   SpO2     Vitals shown include unfiled device data.    Electronically Signed By: JOHANNA Brice CRNA  July 8, 2024  3:56 PM

## 2024-07-08 NOTE — ANESTHESIA PROCEDURE NOTES
Airway       Patient location during procedure: OR       Procedure Start/Stop Times: 7/8/2024 12:08 PM  Staff -        CRNA: Palmira Adler APRN CRNA       Performed By: CRNA  Consent for Airway        Urgency: elective  Indications and Patient Condition       Indications for airway management: teresa-procedural       Induction type:intravenous       Mask difficulty assessment: 1 - vent by mask    Final Airway Details       Final airway type: endotracheal airway       Successful airway: ETT - single and Oral  Endotracheal Airway Details        ETT size (mm): 7.5       Cuffed: yes       Cuff volume (mL): 8       Successful intubation technique: video laryngoscopy       VL Blade Size: Glidescope 4       Grade View of Cords: 1       Adjucts: stylet       Position: Center       Measured from: lips       Secured at (cm): 24       Bite block used: None    Post intubation assessment        Placement verified by: capnometry, equal breath sounds and chest rise        Number of attempts at approach: 1       Number of other approaches attempted: 0       Secured with: tape       Ease of procedure: easy       Dentition: Intact and Unchanged    Medication(s) Administered   Medication Administration Time: 7/8/2024 12:08 PM

## 2024-07-08 NOTE — OR NURSING
"PACU to Inpatient Nursing Handoff    Patient Phoenix M Stremski is a 49 year old adult who speaks English.   Procedure Procedure(s):  Minimal Depth Vaginoplasty   Surgeon(s) Primary: Viraj Dacosta MD  Resident - Assisting: Liana Catsro MD     Allergies   Allergen Reactions    Zithromax [Azithromycin Dihydrate] Hives       Isolation  [unfilled]     Past Medical History   has no past medical history on file.    Anesthesia General   Dermatome Level     Preop Meds Not applicable   Nerve block Not applicable   Intraop Meds dexamethasone (Decadron)  dexmedetomidine (Precedex): 20 mcg total  fentanyl (Sublimaze): 150 mcg total  hydromorphone (Dilaudid): 1.5 mg total  ondansetron (Zofran): last given at 1523   Local Meds Yes   Antibiotics cefazolin (Ancef) - last given at 1208     Pain Patient Currently in Pain: yes   PACU meds  hydromorphone (Dilaudid): .2 mg (total dose) last given at 1620 Tylenol 975 at 1640 and 5mg oxycodone [id number]   PCA / epidural No   Capnography     Telemetry ECG Rhythm: Normal sinus rhythm   Inpatient Telemetry Monitor Ordered? No        Labs Glucose Lab Results   Component Value Date     07/08/2024     08/30/2022       Hgb Lab Results   Component Value Date    HGB 14.7 06/25/2024       INR No results found for: \"INR\"   PACU Imaging Not applicable     Wound/Incision Incision/Surgical Site 07/08/24 Inner Penis (Active)   Incision Assessment UTV 07/08/24 1552   Dressing Other (Comment) 07/08/24 1552   Yoly-Incision Assessment UTV 07/08/24 1552   Closure SOL 07/08/24 1552   Drainage Description UTV 07/08/24 1552   Number of days: 0      CMS     WDL   Equipment Not applicable   Other LDA       IV Access Peripheral IV 07/08/24 Left Hand (Active)   Site Assessment WDL 07/08/24 1552   Line Status Infusing 07/08/24 1552   Dressing Transparent 07/08/24 1552   Dressing Status clean;dry;intact 07/08/24 1552   Dressing Intervention New dressing  07/08/24 1155   Line Intervention Flushed " 07/08/24 1552   Phlebitis Scale 0-->no symptoms 07/08/24 1552   Number of days: 0      Blood Products Not applicable    mL   Intake/Output Date 07/08/24 0700 - 07/09/24 0659   Shift 9056-8980 9206-8175 8869-1526 24 Hour Total   INTAKE   P.O.  200  200   I.V. 600 400  1000   Shift Total(mL/kg) 600(5.14) 600(5.14)  1200(10.27)   OUTPUT   Blood  250  250   Shift Total(mL/kg)  250(2.14)  250(2.14)   Weight (kg) 116.8 116.8 116.8 116.8      Drains / Witt Closed/Suction Drain 2 Left;Right Other (Comment) Bulb 10 Frisian (Active)   Site Description [facility] 07/08/24 1552   Dressing Status Normal: Clean, Dry & Intact 07/08/24 1552   Drainage Appearance Serosanguenous 07/08/24 1552   Status To bulb suction 07/08/24 1552   Number of days: 0       Closed/Suction Drain (Active)   Site Description [facility] 07/08/24 1552   Dressing Status Normal: Clean, Dry & Intact 07/08/24 1552   Drainage Appearance Serosanguenous 07/08/24 1552   Status To bulb suction 07/08/24 1552   Number of days:        Urethral Catheter 07/08/24 Non-latex;Straight-tip 16 fr (Active)   Collection Container Standard 07/08/24 1552   Rationale for Continued Use Surgical procedure 07/08/24 1552   Number of days: 0      Time of void PreOp Time of Void Prior to Procedure: 1030 (07/08/24 1106)    PostOp      Diapered? No   Bladder Scan      mL (scarlett) (07/08/24 1620)  [place] + gram crackers     Vitals    B/P: 119/73  T: 97.9  F (36.6  C)    Temp src: Oral  P:  Pulse: 78 (07/08/24 1615)          R: 12  O2:  SpO2: 93 %    O2 Device: None (Room air) (07/08/24 1615)         FiO2 (%): 5 % (07/08/24 1600)    Family/support present SO [place]     Patient belongings  Backpack and 1 belongings bag   Patient transported on bed   DC meds/scripts (obs/outpt) Not applicable   Inpatient Pain Meds Released? Yes       Special needs/considerations Prefers she/hers/her   Tasks needing completion None       Joelle Galeano, RN  [employer] Vocera

## 2024-07-08 NOTE — PHARMACY-ADMISSION MEDICATION HISTORY
Pharmacist Admission Medication History    Admission medication history is complete. The information provided in this note is only as accurate as the sources available at the time of the update.    Information Source(s): [facility] and [facility] via N[facility]A    Pertinent Information: Medication history completed pre-op by Elana Navarrete, Pharmacy intern. See note on 7/2/24 for full details.     Reviewed dispense history for any medication changes made in between medication history interview and hospital admission.    Changes made to PTA medication list:  Added: None  Deleted: None  Changed: None    Allergies reviewed with patient and updates made in EHR: yes    Medication History Completed By: Carlo Coronel [facility] 7/8/2024 5:24 PM    PTA Med List   Medication Sig Last Dose    escitalopram (LEXAPRO) 20 MG tablet Take 20 mg by mouth daily 7/8/2024 at 0900    estradiol (ESTRACE) 2 MG tablet Take 2 tablets (4 mg) by mouth 2 times daily 7/1/2024 at am    spironolactone (ALDACTONE) 25 MG tablet Take TWO tabs in the morning PO and one tab before bed (Patient taking differently: Take 50 mg by mouth every morning) 7/1/2024 at am

## 2024-07-08 NOTE — ANESTHESIA PREPROCEDURE EVALUATION
Anesthesia Pre-Procedure Evaluation    Patient: Phoenix M Stremski   MRN: [id number] : 1975        Procedure : Procedure(s):  Minimal Depth Vaginoplasty          No past medical history on file.   No past surgical history on file.   Allergies   Allergen Reactions     Zithromax [Azithromycin Dihydrate] Hives      Social History     Tobacco Use     Smoking status: Never     Passive exposure: Never     Smokeless tobacco: Never   Substance Use Topics     Alcohol use: Yes     Comment: 1 beer every 2 weeks      Wt Readings from Last 1 Encounters:   24 115.2 kg (254 lb)        Anesthesia Evaluation   Pt has not had prior anesthetic         ROS/MED HX  ENT/Pulmonary:     (+)     CINTIA risk factors,   obese,                                Neurologic:  - neg neurologic ROS     Cardiovascular:  - neg cardiovascular ROS     METS/Exercise Tolerance:     Hematologic:  - neg hematologic  ROS     Musculoskeletal:  - neg musculoskeletal ROS     GI/Hepatic:  - neg GI/hepatic ROS     Renal/Genitourinary:  - neg Renal ROS     Endo:  - neg endo ROS     Psychiatric/Substance Use:     (+) psychiatric history depression       Infectious Disease:  - neg infectious disease ROS     Malignancy:  - neg malignancy ROS     Other: Comment: Gender Dysmorphia           Physical Exam    Airway        Mallampati: III   TM distance: > 3 FB   Neck ROM: full   Mouth opening: > 3 cm    Respiratory Devices and Support         Dental       (+) Minor Abnormalities - some fillings, tiny chips      Cardiovascular   cardiovascular exam normal          Pulmonary   pulmonary exam normal            OUTSIDE LABS:  CBC:   Lab Results   Component Value Date    WBC 7.9 2022    HGB 14.7 2024    HGB 16.0 2022    HCT 47.4 2022     2022     BMP:   Lab Results   Component Value Date     01/15/2024     2023    POTASSIUM 4.7 01/15/2024    POTASSIUM 4.1 2023    CHLORIDE 101 01/15/2024    CHLORIDE 102  "06/09/2023    CO2 25 01/15/2024    CO2 24 06/09/2023    BUN 18.1 01/15/2024    BUN 16.5 06/09/2023    CR 0.88 01/15/2024    CR 0.85 06/09/2023    GLC 86 01/15/2024     (H) 06/09/2023     COAGS: No results found for: \"PTT\", \"INR\", \"FIBR\"  POC: No results found for: \"BGM\", \"HCG\", \"HCGS\"  HEPATIC:   Lab Results   Component Value Date    ALBUMIN 4.4 01/15/2024    PROTTOTAL 7.0 01/15/2024    ALT 21 01/15/2024    AST 17 01/15/2024    ALKPHOS 53 01/15/2024    BILITOTAL 0.3 01/15/2024     OTHER:   Lab Results   Component Value Date    JOHNNIE 9.4 01/15/2024       Anesthesia Plan    ASA Status:  2    NPO Status:  NPO Appropriate    Anesthesia Type: General.     - Airway: ETT   Induction: Propofol.   Maintenance: Balanced.   Techniques and Equipment:     - Airway: Video-Laryngoscope       Consents    Anesthesia Plan(s) and associated risks, benefits, and realistic alternatives discussed. Questions answered and patient/representative(s) expressed understanding.     - Discussed: Risks, Benefits and Alternatives for BOTH SEDATION and the PROCEDURE were discussed     - Discussed with:  Patient      - Extended Intubation/Ventilatory Support Discussed: No.      - Patient is DNR/DNI Status: No     Use of blood products discussed: No .     Postoperative Care    Pain management: IV analgesics, Oral pain medications.   PONV prophylaxis: Background Propofol Infusion, Ondansetron (or other 5HT-3), Dexamethasone or Solumedrol, Scopolamine patch     Comments:    Other Comments: R/B of GETA discussed with the patient.  She voices understanding and wishes to proceed.    Dr. Nabila Huang MD  Anesthesiology           Nabila Huang MD    I have reviewed the pertinent notes and labs in the chart from the past 30 days and (re)examined the patient.  Any updates or changes from those notes are reflected in this note.              # Obesity: Estimated body mass index is 38.06 kg/m  as calculated from the following:    Height as of 6/25/24: 1.74 " "hanane (5' 8.5\").    Weight as of 6/25/24: 115.2 kg (254 lb).      "

## 2024-07-09 ENCOUNTER — APPOINTMENT (OUTPATIENT)
Dept: PHYSICAL THERAPY | Facility: CLINIC | Age: 49
DRG: 876 | End: 2024-07-09
Attending: NURSE PRACTITIONER
Payer: COMMERCIAL

## 2024-07-09 LAB
ANION GAP SERPL CALCULATED.3IONS-SCNC: 8 MMOL/L (ref 7–15)
BUN SERPL-MCNC: 9.9 MG/DL (ref 6–20)
CALCIUM SERPL-MCNC: 8.5 MG/DL (ref 8.6–10)
CHLORIDE SERPL-SCNC: 105 MMOL/L (ref 98–107)
CREAT SERPL-MCNC: 0.72 MG/DL (ref 0.51–1.17)
DEPRECATED HCO3 PLAS-SCNC: 26 MMOL/L (ref 22–29)
EGFRCR SERPLBLD CKD-EPI 2021: >90 ML/MIN/1.73M2
ERYTHROCYTE [DISTWIDTH] IN BLOOD BY AUTOMATED COUNT: 12.4 % (ref 10–15)
GLUCOSE SERPL-MCNC: 132 MG/DL (ref 70–99)
HCT VFR BLD AUTO: 35.4 % (ref 35–53)
HGB BLD-MCNC: 11.8 G/DL (ref 13.3–17.7)
MCH RBC QN AUTO: 29.9 PG (ref 26.5–33)
MCHC RBC AUTO-ENTMCNC: 33.3 G/DL (ref 31.5–36.5)
MCV RBC AUTO: 90 FL (ref 78–100)
PLATELET # BLD AUTO: 312 10E3/UL (ref 150–450)
POTASSIUM SERPL-SCNC: 3.9 MMOL/L (ref 3.4–5.3)
RBC # BLD AUTO: 3.95 10E6/UL (ref 3.8–5.9)
SODIUM SERPL-SCNC: 139 MMOL/L (ref 135–145)
WBC # BLD AUTO: 16.8 10E3/UL (ref 4–11)

## 2024-07-09 PROCEDURE — 97530 THERAPEUTIC ACTIVITIES: CPT | Mod: GP | Performed by: PHYSICAL THERAPIST

## 2024-07-09 PROCEDURE — 85027 COMPLETE CBC AUTOMATED: CPT | Performed by: STUDENT IN AN ORGANIZED HEALTH CARE EDUCATION/TRAINING PROGRAM

## 2024-07-09 PROCEDURE — 84295 ASSAY OF SERUM SODIUM: CPT | Performed by: STUDENT IN AN ORGANIZED HEALTH CARE EDUCATION/TRAINING PROGRAM

## 2024-07-09 PROCEDURE — 250N000013 HC RX MED GY IP 250 OP 250 PS 637: Performed by: STUDENT IN AN ORGANIZED HEALTH CARE EDUCATION/TRAINING PROGRAM

## 2024-07-09 PROCEDURE — 120N000002 HC R&B MED SURG/OB UMMC

## 2024-07-09 PROCEDURE — 82374 ASSAY BLOOD CARBON DIOXIDE: CPT | Performed by: STUDENT IN AN ORGANIZED HEALTH CARE EDUCATION/TRAINING PROGRAM

## 2024-07-09 PROCEDURE — 250N000011 HC RX IP 250 OP 636: Mod: JZ | Performed by: STUDENT IN AN ORGANIZED HEALTH CARE EDUCATION/TRAINING PROGRAM

## 2024-07-09 PROCEDURE — 36415 COLL VENOUS BLD VENIPUNCTURE: CPT | Performed by: STUDENT IN AN ORGANIZED HEALTH CARE EDUCATION/TRAINING PROGRAM

## 2024-07-09 PROCEDURE — 97161 PT EVAL LOW COMPLEX 20 MIN: CPT | Mod: GP | Performed by: PHYSICAL THERAPIST

## 2024-07-09 PROCEDURE — 258N000003 HC RX IP 258 OP 636: Performed by: STUDENT IN AN ORGANIZED HEALTH CARE EDUCATION/TRAINING PROGRAM

## 2024-07-09 RX ADMIN — CEFAZOLIN SODIUM 2 G: 2 INJECTION, SOLUTION INTRAVENOUS at 04:06

## 2024-07-09 RX ADMIN — SENNOSIDES AND DOCUSATE SODIUM 1 TABLET: 50; 8.6 TABLET ORAL at 08:06

## 2024-07-09 RX ADMIN — ACETAMINOPHEN 650 MG: 325 TABLET, FILM COATED ORAL at 04:06

## 2024-07-09 RX ADMIN — ESCITALOPRAM OXALATE 20 MG: 10 TABLET ORAL at 08:06

## 2024-07-09 RX ADMIN — OXYCODONE HYDROCHLORIDE 5 MG: 5 TABLET ORAL at 08:05

## 2024-07-09 RX ADMIN — OXYCODONE HYDROCHLORIDE 5 MG: 5 TABLET ORAL at 12:53

## 2024-07-09 RX ADMIN — CEFAZOLIN SODIUM 2 G: 2 INJECTION, SOLUTION INTRAVENOUS at 20:05

## 2024-07-09 RX ADMIN — OXYCODONE HYDROCHLORIDE 10 MG: 10 TABLET ORAL at 18:38

## 2024-07-09 RX ADMIN — SODIUM CHLORIDE: 9 INJECTION, SOLUTION INTRAVENOUS at 04:06

## 2024-07-09 RX ADMIN — POLYETHYLENE GLYCOL 3350 17 G: 17 POWDER, FOR SOLUTION ORAL at 08:03

## 2024-07-09 RX ADMIN — CEFAZOLIN SODIUM 2 G: 2 INJECTION, SOLUTION INTRAVENOUS at 12:04

## 2024-07-09 RX ADMIN — ACETAMINOPHEN 650 MG: 325 TABLET, FILM COATED ORAL at 09:57

## 2024-07-09 RX ADMIN — OXYCODONE HYDROCHLORIDE 10 MG: 10 TABLET ORAL at 22:33

## 2024-07-09 RX ADMIN — TOLTERODINE 4 MG: 4 CAPSULE, EXTENDED RELEASE ORAL at 08:07

## 2024-07-09 RX ADMIN — ACETAMINOPHEN 650 MG: 325 TABLET, FILM COATED ORAL at 22:33

## 2024-07-09 RX ADMIN — ACETAMINOPHEN 650 MG: 325 TABLET, FILM COATED ORAL at 16:47

## 2024-07-09 ASSESSMENT — ACTIVITIES OF DAILY LIVING (ADL)
ADLS_ACUITY_SCORE: 26
ADLS_ACUITY_SCORE: 26
ADLS_ACUITY_SCORE: 27
ADLS_ACUITY_SCORE: 26
ADLS_ACUITY_SCORE: 27
ADLS_ACUITY_SCORE: 26
ADLS_ACUITY_SCORE: 27
ADLS_ACUITY_SCORE: 20
ADLS_ACUITY_SCORE: 27
ADLS_ACUITY_SCORE: 26
ADLS_ACUITY_SCORE: 27
ADLS_ACUITY_SCORE: 26
ADLS_ACUITY_SCORE: 26
ADLS_ACUITY_SCORE: 27
ADLS_ACUITY_SCORE: 26
ADLS_ACUITY_SCORE: 27
ADLS_ACUITY_SCORE: 26

## 2024-07-09 NOTE — PLAN OF CARE
[id number]    Patient is A&O x4. Call light within reach. Able to make needs known effectively. Not OOB during the shift. Witt cath in place, draining with yellow colored urine. LBM: 07/08.     On capno with continuous O2 at 1LPM. Regular diet. PIV on L hand infusing with NS at 100ml/hr. C/o pain managed with scheduled and PRN medications. Denies SOB, chest pain, n/v, dizziness and numbness. Reports tingling on R extremity.     BRANDO drain in place, see flowsheet for I&O. Frequent monitoring done. Slept most of the shift. Bed alarm on. Continue with POC.

## 2024-07-09 NOTE — PROGRESS NOTES
"   07/09/24 1324   Appointment Info   Signing Clinician's Name / Credentials (PT) Alejandrina Estevez, TONY   Student Supervision Direct Patient Contact Provided;On-site supervision provided   Rehab Comments (PT) Vaginoplasty precautions/penguin walking   Living Environment   People in Home significant other  (lives with wife)   Current Living Arrangements condominium  (side by side duplex)   Home Accessibility stairs to enter home;stairs within home   Number of Stairs, Main Entrance 4   Stair Railings, Main Entrance railing on right side (ascending);railing on left side (ascending)   Number of Stairs, Within Home, Primary ten   Stair Railings, Within Home, Primary railing on left side (ascending)   Transportation Anticipated family or friend will provide   Self-Care   Usual Activity Tolerance good   Current Activity Tolerance moderate   Regular Exercise Yes   Activity/Exercise Type walking;biking   Exercise Amount/Frequency daily   Equipment Currently Used at Home cane, straight   Fall history within last six months no   Activity/Exercise/Self-Care Comment starting up own business   General Information   Onset of Illness/Injury or Date of Surgery 07/08/24   Referring Physician Giuliana Rosario APRN CNP   Patient/Family Therapy Goals Statement (PT) Did not endorse   Pertinent History of Current Problem (include personal factors and/or comorbidities that impact the POC) Per chart: \"49 year old y/o adult POD#1 s/p minimal depth vaginoplasty. \"   Existing Precautions/Restrictions other (see comments)  (Vaginoplasty precautions/penguin walking)   Weight-Bearing Status - LUE full weight-bearing   Weight-Bearing Status - RUE full weight-bearing   Weight-Bearing Status - LLE full weight-bearing   Weight-Bearing Status - RLE full weight-bearing   General Observations Pt supine in be at start of PT evaluation.   Cognition   Affect/Mental Status (Cognition) WNL   Orientation Status (Cognition) oriented x 4   Follows Commands " (Cognition) WNL   Pain Assessment   Patient Currently in Pain Yes, see Vital Sign flowsheet   Integumentary/Edema   Integumentary/Edema other (describe)   Integumentary/Edema Comments surgical wound, 2 BRANDO drains   Posture    Posture Forward head position;Protracted shoulders   Range of Motion (ROM)   ROM Comment Not formally assessed, no deficits noted with functional mobility   Strength (Manual Muscle Testing)   Strength Comments Not formally assessed, no deficits noted with functional mobility   Bed Mobility   Comment, (Bed Mobility) Supine < > sit SBA with bed rail and HOB elevated   Transfers   Comment, (Transfers) CGA for STS   Gait/Stairs (Locomotion)   Comment, (Gait/Stairs) close SBA with FWW for gait, stairs not assessed   Balance   Balance no deficits were identified   Sensory Examination   Sensory Perception other (describe)   Sensory Perception Comments R hand numbness post-surgery   Coordination   Coordination no deficits were identified   Muscle Tone   Muscle Tone no deficits were identified   Clinical Impression   Criteria for Skilled Therapeutic Intervention Yes, treatment indicated   PT Diagnosis (PT) Impaired functional mobility   Influenced by the following impairments Surgical precautions, pain   Functional limitations due to impairments bed mobility, transfers, gait   Clinical Presentation (PT Evaluation Complexity) stable   Clinical Presentation Rationale Per clinical judgment   Clinical Decision Making (Complexity) low complexity   Planned Therapy Interventions (PT) balance training;bed mobility training;gait training;home exercise program;patient/family education;ROM (range of motion);stair training;strengthening;transfer training;progressive activity/exercise;risk factor education;home program guidelines   Risk & Benefits of therapy have been explained evaluation/treatment results reviewed;care plan/treatment goals reviewed;risks/benefits reviewed   PT Total Evaluation Time   PT Eval, Low  Complexity Minutes (29178) 5   Physical Therapy Goals   PT Frequency Daily   PT Predicted Duration/Target Date for Goal Attainment 07/12/24   PT Goals Bed Mobility;Transfers;Gait;Stairs   PT: Bed Mobility Independent;Within precautions;Supine to/from sit   PT: Transfers Modified independent;Sit to/from stand;Assistive device;Within precautions   PT: Gait Modified independent;Rolling walker;Within precautions;100 feet   PT: Stairs Modified independent;10 stairs;Within precautions;Rail on left;Rail on right   Interventions   Interventions Quick Adds Therapeutic Activity   Therapeutic Activity   Therapeutic Activities: dynamic activities to improve functional performance Minutes (94659) 23   Symptoms Noted During/After Treatment Increased pain;Fatigue   Treatment Detail/Skilled Intervention Pt supine in bed upon PT arrival and agreeable to PT. Pt educated on vaginoplasty precautions of not having LEs too far apart (avoid splits, prolonged sitting, sitting on donut pillow, and avoid sheering when scooting/repositioning). Pt transferred supine > sitting > standing  CGA using bedrail and with HOB elevated. Pt reported pain with moving. Pt stood ~ 2 min SBA. Pt educated on penguin walking, given visual demonstration of penguin walk. Pt ambulated 100' with close SBA and FWW and adhered to precautions. Pt transferred stand > sit CGA with UE support to slow descent. Pt left in recliner on donut pillow at end of session with call light within reach with instruction from PT to call nursing to get back to bed within an hour.   PT Discharge Planning   PT Plan Progress ambulation, stairs, IND with bed mobility.  Assess if pt needs FWW for home   PT Discharge Recommendation (DC Rec) home with assist   PT Rationale for DC Rec Pt below baseline for mobility.   PT Brief overview of current status CGA for bed mobility and transfers, close SBA for gait.   Total Session Time   Timed Code Treatment Minutes 23   Total Session Time (sum of  timed and untimed services) 28

## 2024-07-09 NOTE — PLAN OF CARE
"Pronouns are she/her/hers. Alert and oriented x4. Calm and cooperative, med-compliant. Endorsed pain starting at 3/`10 for incisional pain, but endorsed it \"shooting up with movement.\" Received scheduled and PRN pain medication which kept it under control. Denied nausea. BRANDO draining bright red blood, urinary catheter is positional and draining. Drainage soaked through packing, changed mesh underwear and bedding. Continent of bowel and bladder. Assist of 1 with gait belt and walker. Left PIV dislodged this morning, new PIV inserted by RN flyer. PIV is patent and saline-locked.     Problem: Adult Inpatient Plan of Care  Goal: Absence of Hospital-Acquired Illness or Injury  Outcome: Progressing  Intervention: Identify and Manage Fall Risk  Recent Flowsheet Documentation  Taken 7/9/2024 1500 by Jolanta Greene RN  Safety Promotion/Fall Prevention: clutter free environment maintained  Intervention: Prevent Skin Injury  Recent Flowsheet Documentation  Taken 7/9/2024 0957 by Jolanta Greene RN  Body Position: position maintained  Intervention: Prevent and Manage VTE (Venous Thromboembolism) Risk  Recent Flowsheet Documentation  Taken 7/9/2024 1500 by Jolanta Greene RN  VTE Prevention/Management: SCDs on (sequential compression devices)  Goal: Optimal Comfort and Wellbeing  Outcome: Progressing  Intervention: Monitor Pain and Promote Comfort  Recent Flowsheet Documentation  Taken 7/9/2024 1253 by Jolanta Greene RN  Pain Management Interventions: medication (see MAR)  Taken 7/9/2024 0957 by Jolanta Greene RN  Pain Management Interventions: medication (see MAR)  Taken 7/9/2024 0805 by Jolanta Greene RN  Pain Management Interventions: medication (see MAR)     Problem: Pain Acute  Goal: Optimal Pain Control and Function  Outcome: Progressing  Intervention: Develop Pain Management Plan  Recent Flowsheet Documentation  Taken 7/9/2024 1253 by Jolanta Greene RN  Pain Management Interventions: medication (see " MAR)  Taken 7/9/2024 0957 by Jolanta Greene, RN  Pain Management Interventions: medication (see MAR)  Taken 7/9/2024 0805 by Jolanta Greene, RN  Pain Management Interventions: medication (see MAR)     Problem: Infection  Goal: Absence of Infection Signs and Symptoms  Outcome: Progressing   Goal Outcome Evaluation:

## 2024-07-09 NOTE — PROGRESS NOTES
"Urology  Progress Note    24 hour events/Subjective:     - No acute events overnight   - Pain well controlled on current regimen   - Tolerating regular diet ; no nausea or vomiting   - Not yet ambulating.    Exam  /70 (BP Location: Right arm)   Pulse 65   Temp 98.3  F (36.8  C) (Oral)   Resp 18   Ht 1.74 m (5' 8.5\")   Wt 116.8 kg (257 lb 8 oz)   SpO2 97%   BMI 38.58 kg/m    No acute distress  Unlabored breathing on RA  Abdomen soft, nontender, nondistended.   Bolster in place with moderate saturation   BRANDO drains x2 with serosanguinous output   Witt catheter draining clear yellow urine     Intake/Output Summary (Last 24 hours) at 7/9/2024 0553  Last data filed at 7/9/2024 0410  Gross per 24 hour   Intake 1711.67 ml   Output 2060 ml   Net -348.33 ml       /1000  BRANDO 20/10    Labs  AM labs pending     Assessment/Plan  49 year old y/o adult POD#1 s/p minimal depth vaginoplasty.     Note - plan changes for today are in bold below.    Neuro: pain control: PRN oxycodone 5-10 mg q3h  and scheduled tylenol   CV: JAYESH   Pulm: incentive spirometry while awake  FEN/GI: Regular diet,Discontinue fluids   Endo: JAYESH   : Bolster to stay  - reveal, drains out, and TOV 7/10   -Detrol while catheter in place   Heme/ID: F/u AM labs    -periop Ancef  Activity: Up as tolerated   Ppx: SCDs, ambulation  Dispo: anticipate discharge to home.     Seen and examined with the chief resident. Will discuss with Viraj Dacosta MD.    Mckayla Mishra MD, PGY-2  Urology Resident      PTA medications:   Prior to Admission medications    Medication Sig Start Date End Date Taking? Authorizing Provider   escitalopram (LEXAPRO) 20 MG tablet Take 20 mg by mouth daily 12/4/23  Yes Reported, Patient   estradiol (ESTRACE) 2 MG tablet Take 2 tablets (4 mg) by mouth 2 times daily 6/30/24  Yes Radha-Sahnnon Crowley, APRN CNP   spironolactone (ALDACTONE) 25 MG tablet Take TWO tabs in the morning PO and one tab before bed  Patient taking " "differently: Take 50 mg by mouth every morning 1/15/24  Yes Shannon Laboy, APRN CNP          Contacting the urology team: Please see [employer] and page on-call clinician with any questions or concerns regarding this patient. Note writer may be unavailable.     To access [employer] from intranet: under \"Applications\" --> \"Business Applications\" select \"[employer] Smartweb\" and search \"Urology Adult & Pediatric/[facility].\" Please note that any question about a urology inpatient, [place] or [place], should go to job code [id number].     "

## 2024-07-10 ENCOUNTER — APPOINTMENT (OUTPATIENT)
Dept: PHYSICAL THERAPY | Facility: CLINIC | Age: 49
DRG: 876 | End: 2024-07-10
Attending: UROLOGY
Payer: COMMERCIAL

## 2024-07-10 VITALS
HEART RATE: 76 BPM | BODY MASS INDEX: 38.14 KG/M2 | HEIGHT: 69 IN | WEIGHT: 257.5 LBS | RESPIRATION RATE: 18 BRPM | OXYGEN SATURATION: 96 % | SYSTOLIC BLOOD PRESSURE: 117 MMHG | DIASTOLIC BLOOD PRESSURE: 62 MMHG | TEMPERATURE: 99.1 F

## 2024-07-10 LAB
ANION GAP SERPL CALCULATED.3IONS-SCNC: 7 MMOL/L (ref 7–15)
BUN SERPL-MCNC: 10.6 MG/DL (ref 6–20)
CALCIUM SERPL-MCNC: 8.4 MG/DL (ref 8.6–10)
CHLORIDE SERPL-SCNC: 106 MMOL/L (ref 98–107)
CREAT SERPL-MCNC: 0.76 MG/DL (ref 0.51–1.17)
DEPRECATED HCO3 PLAS-SCNC: 26 MMOL/L (ref 22–29)
EGFRCR SERPLBLD CKD-EPI 2021: >90 ML/MIN/1.73M2
ERYTHROCYTE [DISTWIDTH] IN BLOOD BY AUTOMATED COUNT: 12.4 % (ref 10–15)
GLUCOSE SERPL-MCNC: 110 MG/DL (ref 70–99)
HCT VFR BLD AUTO: 33.3 % (ref 35–53)
HGB BLD-MCNC: 11.3 G/DL (ref 13.3–17.7)
MCH RBC QN AUTO: 31 PG (ref 26.5–33)
MCHC RBC AUTO-ENTMCNC: 33.9 G/DL (ref 31.5–36.5)
MCV RBC AUTO: 92 FL (ref 78–100)
PLATELET # BLD AUTO: 251 10E3/UL (ref 150–450)
POTASSIUM SERPL-SCNC: 4 MMOL/L (ref 3.4–5.3)
RBC # BLD AUTO: 3.64 10E6/UL (ref 3.8–5.9)
SODIUM SERPL-SCNC: 139 MMOL/L (ref 135–145)
WBC # BLD AUTO: 12.4 10E3/UL (ref 4–11)

## 2024-07-10 PROCEDURE — 97530 THERAPEUTIC ACTIVITIES: CPT | Mod: GP

## 2024-07-10 PROCEDURE — 250N000013 HC RX MED GY IP 250 OP 250 PS 637: Performed by: STUDENT IN AN ORGANIZED HEALTH CARE EDUCATION/TRAINING PROGRAM

## 2024-07-10 PROCEDURE — 85027 COMPLETE CBC AUTOMATED: CPT | Performed by: STUDENT IN AN ORGANIZED HEALTH CARE EDUCATION/TRAINING PROGRAM

## 2024-07-10 PROCEDURE — 80048 BASIC METABOLIC PNL TOTAL CA: CPT | Performed by: STUDENT IN AN ORGANIZED HEALTH CARE EDUCATION/TRAINING PROGRAM

## 2024-07-10 PROCEDURE — 36415 COLL VENOUS BLD VENIPUNCTURE: CPT | Performed by: STUDENT IN AN ORGANIZED HEALTH CARE EDUCATION/TRAINING PROGRAM

## 2024-07-10 PROCEDURE — 250N000011 HC RX IP 250 OP 636: Performed by: NURSE PRACTITIONER

## 2024-07-10 PROCEDURE — 250N000011 HC RX IP 250 OP 636: Mod: JZ | Performed by: STUDENT IN AN ORGANIZED HEALTH CARE EDUCATION/TRAINING PROGRAM

## 2024-07-10 RX ORDER — HYDROMORPHONE HYDROCHLORIDE 1 MG/ML
0.3 INJECTION, SOLUTION INTRAMUSCULAR; INTRAVENOUS; SUBCUTANEOUS ONCE
Status: COMPLETED | OUTPATIENT
Start: 2024-07-10 | End: 2024-07-10

## 2024-07-10 RX ORDER — OXYCODONE HYDROCHLORIDE 5 MG/1
5 TABLET ORAL EVERY 4 HOURS PRN
Qty: 12 TABLET | Refills: 0 | Status: SHIPPED | OUTPATIENT
Start: 2024-07-10 | End: 2024-07-13

## 2024-07-10 RX ORDER — SENNA AND DOCUSATE SODIUM 50; 8.6 MG/1; MG/1
1 TABLET, FILM COATED ORAL AT BEDTIME
Qty: 7 TABLET | Refills: 0 | Status: SHIPPED | OUTPATIENT
Start: 2024-07-10 | End: 2024-07-17

## 2024-07-10 RX ADMIN — POLYETHYLENE GLYCOL 3350 17 G: 17 POWDER, FOR SOLUTION ORAL at 07:53

## 2024-07-10 RX ADMIN — ACETAMINOPHEN 650 MG: 325 TABLET, FILM COATED ORAL at 16:14

## 2024-07-10 RX ADMIN — HYDROMORPHONE HYDROCHLORIDE 0.3 MG: 1 INJECTION, SOLUTION INTRAMUSCULAR; INTRAVENOUS; SUBCUTANEOUS at 13:22

## 2024-07-10 RX ADMIN — CEFAZOLIN SODIUM 2 G: 2 INJECTION, SOLUTION INTRAVENOUS at 04:01

## 2024-07-10 RX ADMIN — ACETAMINOPHEN 650 MG: 325 TABLET, FILM COATED ORAL at 11:09

## 2024-07-10 RX ADMIN — SENNOSIDES AND DOCUSATE SODIUM 1 TABLET: 50; 8.6 TABLET ORAL at 07:53

## 2024-07-10 RX ADMIN — ESCITALOPRAM OXALATE 20 MG: 10 TABLET ORAL at 07:52

## 2024-07-10 RX ADMIN — CEFAZOLIN SODIUM 2 G: 2 INJECTION, SOLUTION INTRAVENOUS at 11:10

## 2024-07-10 ASSESSMENT — ACTIVITIES OF DAILY LIVING (ADL)
ADLS_ACUITY_SCORE: 26
ADLS_ACUITY_SCORE: 33
ADLS_ACUITY_SCORE: 26
ADLS_ACUITY_SCORE: 33
ADLS_ACUITY_SCORE: 26
ADLS_ACUITY_SCORE: 33

## 2024-07-10 NOTE — PROGRESS NOTES
"Urology  Progress Note    24 hour events/Subjective:     - No acute events overnight   - Pain well controlled on current regimen   - Tolerating regular diet ; no nausea or vomiting   -   ambulating.   - Passing gas    Exam  /61 (BP Location: Left arm, Patient Position: Semi-Gonzalez's, Cuff Size: Adult Regular)   Pulse 79   Temp 99.4  F (37.4  C) (Oral)   Resp 17   Ht 1.74 m (5' 8.5\")   Wt 116.8 kg (257 lb 8 oz)   SpO2 96%   BMI 38.58 kg/m    No acute distress  Unlabored breathing on RA  Abdomen soft, nontender, nondistended.   Bolster in place with moderate saturation  BRANDO drains x2 with serosanguinous output   Witt catheter draining clear yellow urine       UOP 5200/690  BRANDO   L 40/--  R 5/-    Labs  AM labs pending     Assessment/Plan  49 year old y/o adult POD#2 s/p minimal depth vaginoplasty.     Note - plan changes for today are in bold below.    Neuro: pain control: PRN oxycodone 5-10 mg q3h  and scheduled tylenol   CV: JAYESH   Pulm: incentive spirometry while awake  FEN/GI: Regular diet,Discontinue fluids   Endo: JAYESH   : Bolster to stay  - reveal, drains out, and TOV today  -Detrol while catheter in place   Heme/ID: F/u AM labs    -periop Ancef  Activity: Up as tolerated   Ppx: SCDs, ambulation  Dispo: anticipate discharge to home.     Will discuss with Dr. Dacosta.    Pola Serrano MD, PGY-3  Urology Resident      PTA medications:   Prior to Admission medications    Medication Sig Start Date End Date Taking? Authorizing Provider   escitalopram (LEXAPRO) 20 MG tablet Take 20 mg by mouth daily 12/4/23  Yes Reported, Patient   estradiol (ESTRACE) 2 MG tablet Take 2 tablets (4 mg) by mouth 2 times daily 6/30/24  Yes Shannon Laboy APRN CNP   spironolactone (ALDACTONE) 25 MG tablet Take TWO tabs in the morning PO and one tab before bed  Patient taking differently: Take 50 mg by mouth every morning 1/15/24  Yes Shannon Laboy APRN CNP          Contacting the urology team: Please see " "[employer] and page on-call clinician with any questions or concerns regarding this patient. Note writer may be unavailable.     To access [employer] from intranet: under \"Applications\" --> \"Business Applications\" select \"[employer]\" and search \"Urology Adult & Pediatric/[facility].\" Please note that any question about a urology inpatient, [place] or [place], should go to job code [id number].     "

## 2024-07-10 NOTE — PROGRESS NOTES
"   VS: /70 (BP Location: Right arm)   Pulse 78   Temp 99.2  F (37.3  C) (Oral)   Resp 17   Ht 1.74 m (5' 8.5\")   Wt 116.8 kg (257 lb 8 oz)   SpO2 97%   BMI 38.58 kg/m     O2: Stable on RA, no SOB   Output: Continent of bowel and bladder   Last BM: 7/8   Activity: Regular   Skin: Visible skin intact with surgical sites.    Pain: Managed with PRN pain meds              CMS: A&O x4. No complaints of N/V or numbness/tingling   Dressing: CDI   Diet: Regular   LDA: N/A   Equipment: IV pole, personal belongings   Plan: Call light in reach, continue POC   Additional Info: Witt removed. Bilateral BRANDO drains removed. PIV removed          "

## 2024-07-10 NOTE — PROGRESS NOTES
6MS DISCHARGE    D: Patient discharged to home at 1905. Patient accompanied by spouse.    I: Discharge prescriptions given to patient. All discharge medications and instructions reviewed with patient. Patient instructed to seek care if experiencing worsening symptoms. Other phone numbers to call with questions or concerns after discharge reviewed. PIV removed. Education completed.    A: Patient verbalized understanding of discharge medications and instructions. Prescribed home medications given to patient.  Belongings returned to patient.

## 2024-07-10 NOTE — DISCHARGE SUMMARY
"[facility]   Urology Discharge Summary    Date of Admission: 7/8/2024  Date of Discharge: 07/10/2024    Admission Diagnosis:  Gender dysphoria [F64.9]    Discharge Diagnosis:  1. Same as above    Consultations:  PHYSICAL THERAPY ADULT IP CONSULT     Procedures:  Procedure(s):  Minimal Depth Vaginoplasty    Brief HPI:  Phoenix M Stremski is a 49 year old year old adult with gender dysphoria. After discussion of the risks, benefits, and alternatives, they underwent the aforementioned procedure.     Hospital Course:  The patient tolerated the procedure well without complications and was transferred to the floor post-operatively.The patient's diet was advanced as bowel function returned. Pain was controlled with oral pain medication and the patient was able to ambulate and void without difficulty. The patient received appropriate education post operatively. On 07/10/2024 the patient was discharged to home.     Discharge Physical Exam:  /70 (BP Location: Right arm)   Pulse 78   Temp 99.2  F (37.3  C) (Oral)   Resp 17   Ht 1.74 m (5' 8.5\")   Wt 116.8 kg (257 lb 8 oz)   SpO2 97%   BMI 38.58 kg/m      See progress note from same day for DC exam    Meds:       Review of your medicines        START taking        Dose / Directions   oxyCODONE 5 MG tablet  Commonly known as: ROXICODONE  Used for: Gender dysphoria      Dose: 5 mg  Take 1 tablet (5 mg) by mouth every 4 hours as needed for moderate pain  Quantity: 12 tablet  Refills: 0     SENNA-docusate sodium 8.6-50 MG tablet  Commonly known as: SENNA S  Used for: Gender dysphoria      Dose: 1 tablet  Take 1 tablet by mouth at bedtime for 7 days  Quantity: 7 tablet  Refills: 0            CONTINUE these medicines which may have CHANGED, or have new prescriptions. If we are uncertain of the size of tablets/capsules you have at home, strength may be listed as something that might have changed.        Dose / Directions "   spironolactone 25 MG tablet  Commonly known as: ALDACTONE  This may have changed:   how much to take  how to take this  when to take this  additional instructions  Used for: Gender dysphoria, Male-to-female transgender person      Take TWO tabs in the morning PO and one tab before bed  Quantity: 270 tablet  Refills: 1            CONTINUE these medicines which have NOT CHANGED        Dose / Directions   escitalopram 20 MG tablet  Commonly known as: LEXAPRO      Dose: 20 mg  Take 20 mg by mouth daily  Refills: 0     estradiol 2 MG tablet  Commonly known as: ESTRACE  Used for: Gender dysphoria, Male-to-female transgender person      Dose: 4 mg  Take 2 tablets (4 mg) by mouth 2 times daily  Quantity: 360 tablet  Refills: 1               Where to get your medicines        These medications were sent to [facility] - [facility] [address]  [address] [facility] [address]      Phone: [phone]   SENNA-docusate sodium 8.6-50 MG tablet       Some of these will need a paper prescription and others can be bought over the counter. Ask your nurse if you have questions.    Bring a paper prescription for each of these medications  oxyCODONE 5 MG tablet         Additional instructions:  After Care       Future Labs/Procedures    Activity     Comments:    Your activity upon discharge: see your discharge instruction    Diet     Comments:    Follow this diet upon discharge: see your discharge instruction            Follow Up:  - Follow-up with your urologist as scheduled   - Call or return sooner than your regularly scheduled visit if you develop any of the following: fever (greater than 101.5), uncontrolled pain, uncontrolled nausea or vomiting, as well as increased redness, swelling, or drainage from your wound.     Phone numbers:   - Monday through Friday 8am to 4:30pm: Call [phone] with questions or to schedule or confirm appointment.    - Nights or weekends: call the after hours emergency  "pager - [phone] and tell the  \"I would like to page the Urology Resident on call.\"  - For emergencies, call 911    The patient was discussed with the chief resident and staff on the day of discharge.     Pola Serrano MD  Urology Resident               "

## 2024-07-10 NOTE — PLAN OF CARE
[id number]    Patient is A&O x4. Call light within reach. Able to make needs known effectively. SBA with gait belt and walker. Witt cath in place draining yellowish urine. LBM: 07/08.    RA. Regular diet. PIV on R forearm infusing with scheduled antibiotics. C/o pain during activity and when coughing managed with scheduled and PRN medications. Denies SOB, chest pain, n/v and n/t.     BRANDO drain x2 in place. Bed alarm on. Slept most of the shift. Continue with POC.

## 2024-07-11 ENCOUNTER — PATIENT OUTREACH (OUTPATIENT)
Dept: CARE COORDINATION | Facility: CLINIC | Age: 49
End: 2024-07-11

## 2024-07-11 ENCOUNTER — TELEPHONE (OUTPATIENT)
Dept: PLASTIC SURGERY | Facility: CLINIC | Age: 49
End: 2024-07-11
Payer: COMMERCIAL

## 2024-07-11 LAB
PATH REPORT.COMMENTS IMP SPEC: NORMAL
PATH REPORT.COMMENTS IMP SPEC: NORMAL
PATH REPORT.FINAL DX SPEC: NORMAL
PATH REPORT.GROSS SPEC: NORMAL
PATH REPORT.MICROSCOPIC SPEC OTHER STN: NORMAL
PATH REPORT.RELEVANT HX SPEC: NORMAL
PHOTO IMAGE: NORMAL

## 2024-07-11 NOTE — PROGRESS NOTES
Clinic Care Coordination Contact  Care Coordination Clinician Chart Review    Situation: Patient chart reviewed by care coordinator.    Background: Clinic Care Coordination Referral received from inpatient care team for transition handoff communication following hospital admission.    Assessment: Upon chart review, patient is not a candidate for Primary Care Clinic Care Coordination enrollment due to reason stated below:  Primary Care Clinic Care Coordination will defer follow-up outreach to Specialty Clinic Care Coordination team who are already closely following patient.    Plan/Recommendations: Clinic Care Coordination Referral/order cancelled. RN/SW CC will perform no further monitoring/outreaches at this time and will remain available as needed. If new needs arise, a new Care Coordination Referral may be placed.    Carlitos Kithcen [facility]  Clinic Care Coordinator  [facility]  [phone]  [email or web address]

## 2024-07-11 NOTE — TELEPHONE ENCOUNTER
Pt had minimal depth vaginoplasty on 7/8/24 with Dr. Dacosta. Pt discharged from the hospital 7/10/24. Today is POD # 3. Called to check on patient postoperatively.    Patient did not answer phone, so voicemail was left for her. This provider will send a message through [employer] as well.    JOHANNA Rodriguez CNP on 7/11/2024 at 10:32 AM

## 2024-07-16 ENCOUNTER — TELEPHONE (OUTPATIENT)
Dept: UROLOGY | Facility: CLINIC | Age: 49
End: 2024-07-16
Payer: COMMERCIAL

## 2024-07-16 NOTE — TELEPHONE ENCOUNTER
Left VM with call back number.  Pt returned call quickly    Pt had min depth vaginoplasty on 7/8/24 with Dr. Dacosta. Today is POD # 8. Called to follow up on post-op concern for sharp increased pain. Pt checked self and noticed a small wound.    When did this occur and What occurred prior to pain?  Phoenix describes she walked to coffee shop this morning across the street and she picked up her cat. She noticed the pain when getting off toilet and has observed the vulva.    Any bleeding and from where? Phoenix describes an area of the vulva where she sees some change on right labia. Pt describes that it looks like other stitches are healing but there is one area that looks like a small wound. She is unsure if there is any increase in swelling since there is still so much swelling in this area.     Pain: sharp stabbing pain when moving. Pt wouldn't rate the pain when asked    Pt will send in a photo of the wound/area to [facility] for team to assess.    Layla Sinclair RN on 7/16/2024 at 2:22 PM

## 2024-07-16 NOTE — TELEPHONE ENCOUNTER
[facility] Call Center    Phone Message    May a detailed message be left on voicemail: yes     Reason for Call: Other: Pt thinks he blew out a stitch and has pain. Please call pt back.      Action Taken: Message routed to:  [facility] Care    Travel Screening: Not Applicable

## 2024-07-23 ENCOUNTER — OFFICE VISIT (OUTPATIENT)
Dept: PLASTIC SURGERY | Facility: CLINIC | Age: 49
End: 2024-07-23
Payer: COMMERCIAL

## 2024-07-23 VITALS
WEIGHT: 256.5 LBS | OXYGEN SATURATION: 98 % | BODY MASS INDEX: 37.99 KG/M2 | HEIGHT: 69 IN | SYSTOLIC BLOOD PRESSURE: 113 MMHG | TEMPERATURE: 98.5 F | DIASTOLIC BLOOD PRESSURE: 67 MMHG | HEART RATE: 76 BPM

## 2024-07-23 DIAGNOSIS — N76.4 ABSCESS OF RIGHT GENITAL LABIA: Primary | ICD-10-CM

## 2024-07-23 PROCEDURE — 99024 POSTOP FOLLOW-UP VISIT: CPT | Mod: GC | Performed by: UROLOGY

## 2024-07-23 RX ORDER — ACETAMINOPHEN 325 MG/1
325-650 TABLET ORAL EVERY 6 HOURS PRN
COMMUNITY

## 2024-07-23 RX ORDER — OMEGA-3 FATTY ACIDS/FISH OIL 300-1000MG
200 CAPSULE ORAL EVERY 4 HOURS PRN
COMMUNITY

## 2024-07-23 ASSESSMENT — PAIN SCALES - GENERAL: PAINLEVEL: MILD PAIN (2)

## 2024-07-23 NOTE — NURSING NOTE
"Chief Complaint   Patient presents with    Surgical Followup     2 week post-op, DOS 7/8/24.       Vitals:    07/23/24 1407   BP: 113/67   BP Location: Left arm   Patient Position: Sitting   Cuff Size: Adult Large   Pulse: 76   Temp: 98.5  F (36.9  C)   TempSrc: Oral   SpO2: 98%   Weight: 256 lb 8 oz   Height: 5' 8.5\"       Body mass index is 38.43 kg/m .    Patient reports mild vaginal pain (2/10).    Jonas Lo, EMT    "

## 2024-07-23 NOTE — LETTER
"7/23/2024       RE: Phoenix M Stremski  [address]  [facility] [address]     Dear Colleague,    Thank you for referring your patient, Phoenix M Stremski, to the [facility] [place] at . Please see a copy of my visit note below.    Urology Clinic Note      Date: 7/23/2024  Time: 2:21 PM  Patient: Phoenix M Stremski  MRN: [id number]    Reason for Visit: 2 week follow up     HPI/Subjective: Phoenix M Stremski is a 49 year old adult who is 2 weeks s/p minimal depth vaginoplasty on 7/8/24.     Reports having nightly hot flashes and sweating that is new since surgery. Denies fevers or chills during the day. Otherwise feels well. Pain is controlled with scheduled Tylenol and ibuprofen. Tolerating regular diet. No other concerns.     Objective:  /67 (BP Location: Left arm, Patient Position: Sitting, Cuff Size: Adult Large)   Pulse 76   Temp 98.5  F (36.9  C) (Oral)   Ht 1.74 m (5' 8.5\")   Wt 116.3 kg (256 lb 8 oz)   SpO2 98%   BMI 38.43 kg/m    Gen: In NAD, conversant.  Resp: Breathing non-labored on room air.  CV: Warm and well perfused  : thin purulent drainage with fullness at lower apex of vaginal canal and right labia majora fullness   Ext: Moving all 4 extremities  Neuro: No focal deficits noted      Assessment & Plan:     Phoenix M Stremski is a 49 year old adult who is 2 weeks s/p minimal depth vaginoplasty.     Has small abscess at inferior apex that is likely contributing to daily night sweats. Plan for 2 week course of antibiotics and earlier follow-up.      - Augmentin x 2 weeks     -  F/u in 2 weeks     I did discuss OR incision and drainage, but I recommend abx first. Will monitor closely     Mckayla Mishra MD  Urology Resident, PGY2    Physician Attestation  I, Viraj Dacosta MD, saw this patient and agree with the findings and plan of care as documented in the note.  "           Again, thank you for allowing me to participate in the care of your patient.      Sincerely,    Viraj Dacosta MD

## 2024-07-23 NOTE — PROGRESS NOTES
"Urology Clinic Note      Date: 7/23/2024  Time: 2:21 PM  Patient: Phoenix M Stremski  MRN: [id number]    Reason for Visit: 2 week follow up     HPI/Subjective: Phoenix M Stremski is a 49 year old adult who is 2 weeks s/p minimal depth vaginoplasty on 7/8/24.     Reports having nightly hot flashes and sweating that is new since surgery. Denies fevers or chills during the day. Otherwise feels well. Pain is controlled with scheduled Tylenol and ibuprofen. Tolerating regular diet. No other concerns.     Objective:  /67 (BP Location: Left arm, Patient Position: Sitting, Cuff Size: Adult Large)   Pulse 76   Temp 98.5  F (36.9  C) (Oral)   Ht 1.74 m (5' 8.5\")   Wt 116.3 kg (256 lb 8 oz)   SpO2 98%   BMI 38.43 kg/m    Gen: In NAD, conversant.  Resp: Breathing non-labored on room air.  CV: Warm and well perfused  : thin purulent drainage with fullness at lower apex of vaginal canal and right labia majora fullness   Ext: Moving all 4 extremities  Neuro: No focal deficits noted      Assessment & Plan:     Phoenix M Stremski is a 49 year old adult who is 2 weeks s/p minimal depth vaginoplasty.     Has small abscess at inferior apex that is likely contributing to daily night sweats. Plan for 2 week course of antibiotics and earlier follow-up.      - Augmentin x 2 weeks     -  F/u in 2 weeks     I did discuss OR incision and drainage, but I recommend abx first. Will monitor closely     Mckayla Mishra MD  Urology Resident, PGY2    Physician Attestation   I, Viraj Dacosta MD, saw this patient and agree with the findings and plan of care as documented in the note.      Viraj Dacosta MD  Reconstructive Urology    "

## 2024-07-24 ENCOUNTER — PRE VISIT (OUTPATIENT)
Dept: UROLOGY | Facility: CLINIC | Age: 49
End: 2024-07-24
Payer: COMMERCIAL

## 2024-07-24 NOTE — TELEPHONE ENCOUNTER
Reason for visit: post op      Dx/Hx/Sx: minimal depth vaginoplasty     Records/imaging/labs/orders: in epic     At Rooming: standard

## 2024-07-26 ENCOUNTER — NURSE TRIAGE (OUTPATIENT)
Dept: NURSING | Facility: CLINIC | Age: 49
End: 2024-07-26
Payer: COMMERCIAL

## 2024-07-27 NOTE — TELEPHONE ENCOUNTER
Half way thru course of antibiotics. They didn't have all the pills in stock. The Robot let him know the prescription was ready. He's on bed rest and spouse was at work. I connected him with the [facility] [place] page  to page out Urology from the [facility] to call him back directly.  I also gave the patient the call back  number incase he doesn't hear from them within 30 minutes.  Nettie Sandoval RN  [facility]    Reason for Disposition   [1] Caller has URGENT medicine question about med that PCP or specialist prescribed AND [2] triager unable to answer question    Additional Information   Negative: [1] Intentional drug overdose AND [2] suicidal thoughts or ideas   Negative: Drug overdose and triager unable to answer question   Negative: Caller requesting a renewal or refill of a medicine patient is currently taking   Negative: Caller requesting information unrelated to medicine   Negative: Caller requesting information about COVID-19 Vaccine   Negative: Caller requesting information about Emergency Contraception   Negative: Caller requesting information about Combined Birth Control Pills   Negative: Caller requesting information about Progestin Birth Control Pills   Negative: Caller requesting information about Post-Op pain or medicines   Negative: Caller requesting a prescription antibiotic (such as Penicillin) for Strep throat and has a positive culture result   Negative: Caller requesting a prescription anti-viral med (such as Tamiflu) and has influenza (flu) symptoms   Negative: Immunization reaction suspected   Negative: Rash while taking a medicine or within 3 days of stopping it   Negative: [1] Asthma and [2] having symptoms of asthma (cough, wheezing, etc.)   Negative: [1] Symptom of illness (e.g., headache, abdominal pain, earache, vomiting) AND [2] more than mild   Negative: Breastfeeding questions about mother's medicines and diet   Negative: MORE THAN A DOUBLE DOSE of a prescription  or over-the-counter (OTC) drug   Negative: [1] DOUBLE DOSE (an extra dose or lesser amount) of prescription drug AND [2] any symptoms (e.g., dizziness, nausea, pain, sleepiness)   Negative: [1] DOUBLE DOSE (an extra dose or lesser amount) of over-the-counter (OTC) drug AND [2] any symptoms (e.g., dizziness, nausea, pain, sleepiness)   Negative: Took another person's prescription drug   Negative: [1] Pharmacy calling with prescription question AND [2] triager unable to answer question   Negative: Diabetes drug error or overdose (e.g., took wrong type of insulin or took extra dose)   Negative: [1] Prescription not at pharmacy AND [2] was prescribed by PCP recently (Exception: Triager has access to EMR and prescription is recorded there. Go to Home Care and confirm for pharmacy.)   Negative: [1] DOUBLE DOSE (an extra dose or lesser amount) of prescription drug AND [2] NO symptoms  (Exception: A double dose of antibiotics.)    Protocols used: Medication Question Call-A-

## 2024-08-02 ENCOUNTER — LAB (OUTPATIENT)
Dept: LAB | Facility: CLINIC | Age: 49
End: 2024-08-02
Payer: COMMERCIAL

## 2024-08-02 DIAGNOSIS — F64.9 GENDER DYSPHORIA: ICD-10-CM

## 2024-08-02 PROCEDURE — 36415 COLL VENOUS BLD VENIPUNCTURE: CPT

## 2024-08-02 PROCEDURE — 84403 ASSAY OF TOTAL TESTOSTERONE: CPT

## 2024-08-06 LAB — TESTOST SERPL-MCNC: 21 NG/DL (ref 8–950)

## 2024-08-07 ENCOUNTER — OFFICE VISIT (OUTPATIENT)
Dept: UROLOGY | Facility: CLINIC | Age: 49
End: 2024-08-07
Payer: COMMERCIAL

## 2024-08-07 VITALS
SYSTOLIC BLOOD PRESSURE: 119 MMHG | WEIGHT: 254.5 LBS | HEIGHT: 69 IN | DIASTOLIC BLOOD PRESSURE: 78 MMHG | OXYGEN SATURATION: 98 % | HEART RATE: 65 BPM | BODY MASS INDEX: 37.69 KG/M2

## 2024-08-07 DIAGNOSIS — Z87.890 STATUS POST GENDER AFFIRMATION SURGERY: Primary | ICD-10-CM

## 2024-08-07 DIAGNOSIS — F64.9 GENDER DYSPHORIA: ICD-10-CM

## 2024-08-07 PROCEDURE — 99024 POSTOP FOLLOW-UP VISIT: CPT | Performed by: UROLOGY

## 2024-08-07 ASSESSMENT — PAIN SCALES - GENERAL: PAINLEVEL: NO PAIN (0)

## 2024-08-07 NOTE — PROGRESS NOTES
"SUBJECTIVE:  Phoenix is a 49 year old who underwent minimal depth vaginoplasty on 7/8/2024. She was found to have a localized abscess at inferior apex of vaginal canal extending into right labia majora at her two week PO appointment. Started on 2 week course of Augmentin to treat infection. She is here for reevaluation of this infection. She has completed the course of antibiotics with last dose last night. She started feeling better within 1 or 2 days of starting antibiotics and has noticed a great improvement in drainage, pain, and other symptoms.   She is still experiencing a lot of hypersensitivity in clitoris, but feeling hopeful. She is getting around easier and walking feels good. She is wondering when she can get back to kayaking and [place].    OBJECTIVE:  /78   Pulse 65   Ht 1.74 m (5' 8.5\")   Wt 115.4 kg (254 lb 8 oz)   SpO2 98%   BMI 38.13 kg/m    General: NAD  Vulvar incisions healing well without wounds or drainage  Clitoris healthy ad well-hooded  Urethral plate continuing to heal  Some residual sutures still present. Scant amount sebum and skin debris along inner labia    ASSESSMENT/PLAN:  S/P minimal depth vaginoplasty  Post-op abscess (right labia) - infection resolved. No signs of ongoing abscess or drainage  Okay for clitoral stimulation - discussed desensitization  Okay to start kayaking and short times in [place]  Gradual return to activity okay  RTC in 2 weeks as scheduled with KOTA Rodriguez APRN, CNP    Physician Attestation   I, Viraj Dacosta MD, saw this patient and agree with the findings and plan of care as documented in the note.      Viraj Dacosta MD  Reconstructive Urology      "

## 2024-08-07 NOTE — LETTER
"8/7/2024       RE: Phoenix M Stremski  [address]  [facility] [address]     Dear Colleague,    Thank you for referring your patient, Phoenix M Stremski, to the [facility] [place] at [facility]. Please see a copy of my visit note below.    SUBJECTIVE:  Phoenix is a 49 year old who underwent minimal depth vaginoplasty on 7/8/2024. She was found to have a localized abscess at inferior apex of vaginal canal extending into right labia majora at her two week PO appointment. Started on 2 week course of Augmentin to treat infection. She is here for reevaluation of this infection. She has completed the course of antibiotics with last dose last night. She started feeling better within 1 or 2 days of starting antibiotics and has noticed a great improvement in drainage, pain, and other symptoms.   She is still experiencing a lot of hypersensitivity in clitoris, but feeling hopeful. She is getting around easier and walking feels good. She is wondering when she can get back to kayaking and when [place] to [place].    OBJECTIVE:  /78   Pulse 65   Ht 1.74 m (5' 8.5\")   Wt 115.4 kg (254 lb 8 oz)   SpO2 98%   BMI 38.13 kg/m    General: NAD  Vulvar incisions healing well without wounds or drainage  Clitoris healthy ad well-hooded  Urethral plate continuing to heal  Some residual sutures still present. Scant amount sebum and skin debris along inner labia    ASSESSMENT/PLAN:  S/P minimal depth vaginoplasty  Post-op abscess (right labia) - infection resolved. No signs of ongoing abscess or drainage  Okay for clitoral stimulation - discussed desensitization  Okay to start kayaking and [place]  Gradual return to activity okay  RTC in 2 weeks as scheduled with KOTA Rodriguez APRN, CNP    Physician Attestation  I, Viraj Dacosta MD, saw this patient and agree with the findings and plan of care as documented in the " note.      Viraj Dacosta MD  Reconstructive Urology        Again, thank you for allowing me to participate in the care of your patient.      Sincerely,    Viraj Dacosta MD

## 2024-08-07 NOTE — NURSING NOTE
"Chief Complaint   Patient presents with    Follow Up     Post op          Blood pressure 119/78, pulse 65, height 1.74 m (5' 8.5\"), weight 115.4 kg (254 lb 8 oz), SpO2 98%. Body mass index is 38.13 kg/m .    Patient Active Problem List   Diagnosis    Male-to-female transgender person    Gender dysphoria    Recurrent major depressive disorder, in partial remission (H24)       Allergies   Allergen Reactions    Zithromax [Azithromycin Dihydrate] Hives       Current Outpatient Medications   Medication Sig Dispense Refill    acetaminophen (TYLENOL) 325 MG tablet Take 325-650 mg by mouth every 6 hours as needed for mild pain      amoxicillin-clavulanate (AUGMENTIN) 875-125 MG tablet Take 1 tablet by mouth 2 times daily 28 tablet 0    escitalopram (LEXAPRO) 20 MG tablet Take 20 mg by mouth daily      estradiol (ESTRACE) 2 MG tablet Take 2 tablets (4 mg) by mouth 2 times daily 360 tablet 1    ibuprofen (ADVIL/MOTRIN) 200 MG capsule Take 200 mg by mouth every 4 hours as needed for fever      spironolactone (ALDACTONE) 25 MG tablet Take TWO tabs in the morning PO and one tab before bed (Patient taking differently: Take 50 mg by mouth every morning) 270 tablet 1       Social History     Tobacco Use    Smoking status: Never     Passive exposure: Never    Smokeless tobacco: Never   Vaping Use    Vaping status: Never Used   Substance Use Topics    Alcohol use: Yes     Comment: 1 beer every 2 weeks    Drug use: Yes     Types: Marijuana     Comment: smoke       Linda Uli  8/7/2024  8:41 AM     "

## 2024-08-09 ENCOUNTER — VIRTUAL VISIT (OUTPATIENT)
Dept: PEDIATRICS | Facility: CLINIC | Age: 49
End: 2024-08-09
Payer: COMMERCIAL

## 2024-08-09 DIAGNOSIS — Z78.9 MALE-TO-FEMALE TRANSGENDER PERSON: Primary | ICD-10-CM

## 2024-08-09 DIAGNOSIS — F64.9 GENDER DYSPHORIA: ICD-10-CM

## 2024-08-09 PROCEDURE — G2211 COMPLEX E/M VISIT ADD ON: HCPCS | Mod: 95 | Performed by: NURSE PRACTITIONER

## 2024-08-09 PROCEDURE — 99213 OFFICE O/P EST LOW 20 MIN: CPT | Mod: 95 | Performed by: NURSE PRACTITIONER

## 2024-08-09 NOTE — PROGRESS NOTES
"Phoenix is a 49 year old who is being evaluated via a billable video visit.    How would you like to obtain your AVS? MyChart  If the video visit is dropped, the invitation should be resent by: Text to cell phone: [phone]  Will anyone else be joining your video visit? No      Assessment & Plan     Male-to-female transgender person  Doing well with recovering from vaginoplasty surgery. Pain manged well through otc nsaids and gradually increasing activity. Has follow-up appointment schedule with uro - last labs reviewed, will schedule lab appointment next wk to recheck ca. Stopped nasrin, ok to keep est the same and monitor.   - Basic metabolic panel  (Ca, Cl, CO2, Creat, Gluc, K, Na, BUN); Future  - CBC with platelets and differential; Future    Gender dysphoria    - Basic metabolic panel  (Ca, Cl, CO2, Creat, Gluc, K, Na, BUN); Future  - CBC with platelets and differential; Future    The longitudinal plan of care for the diagnosis(es)/condition(s) as documented were addressed during this visit. Due to the added complexity in care, I will continue to support Phoenix in the subsequent management and with ongoing continuity of care.      BMI  Estimated body mass index is 38.13 kg/m  as calculated from the following:    Height as of 8/7/24: 1.74 m (5' 8.5\").    Weight as of 8/7/24: 115.4 kg (254 lb 8 oz).             Subjective   Phoenix is a 49 year old, presenting for the following health issues:  RECHECK        8/9/2024     8:21 AM   Additional Questions   Roomed by SARA Castro   Accompanied by no         8/9/2024     8:21 AM   Patient Reported Additional Medications   Patient reports taking the following new medications no     HPI     Status post vaginoplasty last month. She notes she stopped nasrin, not in much pain. Of note she had blood drawn two days after surgery and noted calcium was 8.4. No symptoms of concern.    She has gradually increased activity. Using OTC NSAIDs prn, not scheduled at this point. " "                Objective    Vitals - Patient Reported  Weight (Patient Reported): 115.2 kg (254 lb)  Height (Patient Reported): 174 cm (5' 8.5\")  BMI (Based on Pt Reported Ht/Wt): 38.06  Pain Score: No Pain (0)        Physical Exam   GENERAL: alert and no distress  EYES: Eyes grossly normal to inspection.  No discharge or erythema, or obvious scleral/conjunctival abnormalities.  RESP: No audible wheeze, cough, or visible cyanosis.    SKIN: Visible skin clear. No significant rash, abnormal pigmentation or lesions.  NEURO: Cranial nerves grossly intact.  Mentation and speech appropriate for age.  PSYCH: Appropriate affect, tone, and pace of words          Video-Visit Details    Type of service:  Video Visit   Originating Location (pt. Location): Home    Distant Location (provider location):  On-site  Platform used for Video Visit: Gabino  Signed Electronically by: JOHANNA Espinoza CNP    "

## 2024-08-19 ENCOUNTER — OFFICE VISIT (OUTPATIENT)
Dept: PLASTIC SURGERY | Facility: CLINIC | Age: 49
End: 2024-08-19
Payer: COMMERCIAL

## 2024-08-19 VITALS
OXYGEN SATURATION: 98 % | WEIGHT: 256 LBS | HEIGHT: 69 IN | BODY MASS INDEX: 37.92 KG/M2 | HEART RATE: 66 BPM | SYSTOLIC BLOOD PRESSURE: 124 MMHG | DIASTOLIC BLOOD PRESSURE: 79 MMHG

## 2024-08-19 DIAGNOSIS — Z87.890 STATUS POST GENDER AFFIRMATION SURGERY: Primary | ICD-10-CM

## 2024-08-19 PROCEDURE — 99024 POSTOP FOLLOW-UP VISIT: CPT | Performed by: NURSE PRACTITIONER

## 2024-08-19 ASSESSMENT — PAIN SCALES - GENERAL: PAINLEVEL: MILD PAIN (2)

## 2024-08-19 NOTE — PROGRESS NOTES
"SUBJECTIVE:  Phoenix is a 49 year old who underwent minimal depth vaginoplasty with Dr Dacosta on 07/08/2024. She developed a small localized wound infection on right side labia and completed a 2 week course of antibiotics. At last follow-up on 8/7 the infection was completely resolved and she was healing well.   She is here today for her 6 week post-op follow-up. Her main is minimal and she is feeling good. She is able to do more and more normal activity each day. She went for an 8 hour recumbent bike ride this weekend with no pain or complications. She was understandably fatigued, but no additional drainage or pain.   She still has sensitivity in clitoris, but feels like it is slowly improving.   No longer has to wear a pad every day as drainage decreased a lot.  She is happy that all stitches seem to be absorbed now.   Urination is improving with less spraying. All urine goes into the toilet, so that is good improvement.  No desire for revisions based on how things are looking and functioning so far.    OBJECTIVE:  /79 (BP Location: Left arm, Patient Position: Chair, Cuff Size: Adult Large)   Pulse 66   Ht 5' 8.5\"   Wt 256 lb   SpO2 98%   BMI 38.36 kg/m     General: NAD  Vulvar incisions c/d/I without wounds or drainage  Clitoris healthy and well hooded  Urethra and urethral plate healing well      ASSESSMENT/PLAN:  S/P minimal depth vaginoplasty  Healing well  Encouraged continued work to help desensitize   RTC for 4 month follow-up with Giuliana Rosario, JOHANNA Contreras, CNP    A total of 25 minutes was spent today with patient, reviewing records, completing charting, and other tasks as detailed above.   "

## 2024-08-19 NOTE — NURSING NOTE
"Chief Complaint   Patient presents with    RECHECK     Phoenix, is being seen today for 6 wk post-op DOS 7/8/24, no concerns at this time, as reported by patient.       Vitals:    08/19/24 1042   BP: 124/79   BP Location: Left arm   Patient Position: Chair   Cuff Size: Adult Large   Pulse: 66   SpO2: 98%   Weight: 116.1 kg (256 lb)   Height: 1.74 m (5' 8.5\")       Body mass index is 38.36 kg/m .      Pamela Gamez LPN    "

## 2024-08-19 NOTE — LETTER
"8/19/2024       RE: Phoenix M Stremski  [address]  [place] [address]     Dear Colleague,    Thank you for referring your patient, Phoenix M Stremski, to the [facility] [place] at [facility]. Please see a copy of my visit note below.    SUBJECTIVE:  Phoenix is a 49 year old who underwent minimal depth vaginoplasty with Dr Dacosta on 07/08/2024. She developed a small localized wound infection on right side labia and completed a 2 week course of antibiotics. At last follow-up on 8/7 the infection was completely resolved and she was healing well.   She is here today for her 6 week post-op follow-up. Her main is minimal and she is feeling good. She is able to do more and more normal activity each day. She went for an 8 hour recumbent bike ride this weekend with no pain or complications. She was understandably fatigued, but no additional drainage or pain.   She still has sensitivity in clitoris, but feels like it is slowly improving.   No longer has to wear a pad every day as drainage decreased a lot.  She is happy that all stitches seem to be absorbed now.   Urination is improving with less spraying. All urine goes into the toilet, so that is good improvement.  No desire for revisions based on how things are looking and functioning so far.    OBJECTIVE:  /79 (BP Location: Left arm, Patient Position: Chair, Cuff Size: Adult Large)   Pulse 66   Ht 5' 8.5\"   Wt 256 lb   SpO2 98%   BMI 38.36 kg/m     General: NAD  Vulvar incisions c/d/I without wounds or drainage  Clitoris healthy and well hooded  Urethra and urethral plate healing well      ASSESSMENT/PLAN:  S/P minimal depth vaginoplasty  Healing well  Encouraged continued work to help desensitize   RTC for 4 month follow-up with Giuliana Rosario, KOTA Rosario, APRN, CNP    A total of 25 minutes was spent today with patient, reviewing records, " completing charting, and other tasks as detailed above.       Again, thank you for allowing me to participate in the care of your patient.      Sincerely,    JOHANNA Rodriguez CNP

## 2024-11-11 ENCOUNTER — OFFICE VISIT (OUTPATIENT)
Dept: PLASTIC SURGERY | Facility: CLINIC | Age: 49
End: 2024-11-11
Payer: COMMERCIAL

## 2024-11-11 VITALS
BODY MASS INDEX: 38.36 KG/M2 | HEART RATE: 61 BPM | HEIGHT: 69 IN | OXYGEN SATURATION: 98 % | DIASTOLIC BLOOD PRESSURE: 83 MMHG | SYSTOLIC BLOOD PRESSURE: 126 MMHG

## 2024-11-11 DIAGNOSIS — Z87.890 STATUS POST GENDER AFFIRMATION SURGERY: Primary | ICD-10-CM

## 2024-11-11 ASSESSMENT — PAIN SCALES - GENERAL: PAINLEVEL_OUTOF10: NO PAIN (0)

## 2024-11-11 NOTE — PROGRESS NOTES
"SUBJECTIVE:  Phoenix is a 49 year old who underwent minimal depth vaginoplasty with Dr Dacosta on 07/08/2024. She developed a small localized wound infection on right side labia and completed 2 weeks of antibiotics. The infection resolved without complications and she was healing well at her 6 week post-op visit on 08/19/2024.  She is here for her 4 month post-op visit. She is fully healed and reports no concerns related to vaginoplasty. Clitoris has good sensation. Urination is good without significant spraying or other concerns. She is exteremly happy with her outcome and wishes she had done this sooner. No desire for revisions. She is very emotional about election results and expresses fear about what is to come.     OBJECTIVE:  /83 (BP Location: Left arm, Patient Position: Sitting, Cuff Size: Adult Large)   Pulse 61   Ht 1.74 m (5' 8.5\")   SpO2 98%   BMI 38.36 kg/m     General: NAD  Vulvar incisions c/d/I without wounds  Good symmetry of labia  Clitoris healthy and well hooded  Urethral plate healing well and urethra midline      ASSESSMENT/PLAN:  S/P minimal depth vaginoplasty  Well healed  No desire for revisions  Reassured patient that our team is here and continues to provide care  Will follow-up in 2 months to schedule patient for her 1 year post-op follow-up    JOHANNA Allen, CNP    A total of 20 minutes was spent today with patient, reviewing records, completing charting, and other tasks as detailed above.   "

## 2024-11-11 NOTE — LETTER
"11/11/2024       RE: Phoenix M Stremski  [address]  [place] [address]     Dear Colleague,    Thank you for referring your patient, Phoenix M Stremski, to the [facility] [place] at [facility]. Please see a copy of my visit note below.    SUBJECTIVE:  Phoenix is a 49 year old who underwent minimal depth vaginoplasty with Dr Dacosta on 07/08/2024. She developed a small localized wound infection on right side labia and completed 2 weeks of antibiotics. The infection resolved without complications and she was healing well at her 6 week post-op visit on 08/19/2024.  She is here for her 4 month post-op visit. She is fully healed and reports no concerns related to vaginoplasty. Clitoris has good sensation. Urination is good without significant spraying or other concerns. She is exteremly happy with her outcome and wishes she had done this sooner. No desire for revisions. She is very emotional about election results and expresses fear about what is to come.     OBJECTIVE:  /83 (BP Location: Left arm, Patient Position: Sitting, Cuff Size: Adult Large)   Pulse 61   Ht 1.74 m (5' 8.5\")   SpO2 98%   BMI 38.36 kg/m     General: NAD  Vulvar incisions c/d/I without wounds  Good symmetry of labia  Clitoris healthy and well hooded  Urethral plate healing well and urethra midline      ASSESSMENT/PLAN:  S/P minimal depth vaginoplasty  Well healed  No desire for revisions  Reassured patient that our team is here and continues to provide care  Will follow-up in 2 months to schedule patient for her 1 year post-op follow-up    JOHANNA Allen, CNP    A total of 20 minutes was spent today with patient, reviewing records, completing charting, and other tasks as detailed above.       Again, thank you for allowing me to participate in the care of your patient.      Sincerely,    JOHANNA Rodriguez CNP    "

## 2024-11-11 NOTE — NURSING NOTE
"Chief Complaint   Patient presents with    Surgical Followup     4 month post-op, DOS 7/8/24.       Vitals:    11/11/24 1043   BP: 126/83   BP Location: Left arm   Patient Position: Sitting   Cuff Size: Adult Large   Pulse: 61   SpO2: 98%   Height: 5' 8.5\"       Body mass index is 38.36 kg/m .      Jonas Lo, EMT    "

## 2025-01-20 ENCOUNTER — OFFICE VISIT (OUTPATIENT)
Dept: PEDIATRICS | Facility: CLINIC | Age: 50
End: 2025-01-20
Attending: NURSE PRACTITIONER

## 2025-01-20 VITALS
HEIGHT: 69 IN | SYSTOLIC BLOOD PRESSURE: 122 MMHG | BODY MASS INDEX: 40.94 KG/M2 | HEART RATE: 62 BPM | OXYGEN SATURATION: 98 % | WEIGHT: 276.4 LBS | RESPIRATION RATE: 18 BRPM | TEMPERATURE: 97.9 F | DIASTOLIC BLOOD PRESSURE: 68 MMHG

## 2025-01-20 DIAGNOSIS — E66.813 CLASS 3 SEVERE OBESITY DUE TO EXCESS CALORIES WITH SERIOUS COMORBIDITY AND BODY MASS INDEX (BMI) OF 40.0 TO 44.9 IN ADULT (H): ICD-10-CM

## 2025-01-20 DIAGNOSIS — F64.9 GENDER DYSPHORIA: ICD-10-CM

## 2025-01-20 DIAGNOSIS — E66.01 CLASS 3 SEVERE OBESITY DUE TO EXCESS CALORIES WITH SERIOUS COMORBIDITY AND BODY MASS INDEX (BMI) OF 40.0 TO 44.9 IN ADULT (H): ICD-10-CM

## 2025-01-20 DIAGNOSIS — Z00.00 ROUTINE GENERAL MEDICAL EXAMINATION AT A HEALTH CARE FACILITY: Primary | ICD-10-CM

## 2025-01-20 DIAGNOSIS — F33.41 RECURRENT MAJOR DEPRESSIVE DISORDER, IN PARTIAL REMISSION: ICD-10-CM

## 2025-01-20 DIAGNOSIS — R53.83 OTHER FATIGUE: ICD-10-CM

## 2025-01-20 DIAGNOSIS — Z78.9 MALE-TO-FEMALE TRANSGENDER PERSON: ICD-10-CM

## 2025-01-20 DIAGNOSIS — R63.5 WEIGHT GAIN: ICD-10-CM

## 2025-01-20 LAB
ALBUMIN SERPL BCG-MCNC: 4.3 G/DL (ref 3.5–5.2)
ALP SERPL-CCNC: 72 U/L (ref 40–150)
ALT SERPL W P-5'-P-CCNC: 14 U/L (ref 0–70)
ANION GAP SERPL CALCULATED.3IONS-SCNC: 11 MMOL/L (ref 7–15)
AST SERPL W P-5'-P-CCNC: 17 U/L (ref 0–45)
BASOPHILS # BLD AUTO: 0 10E3/UL (ref 0–0.2)
BASOPHILS NFR BLD AUTO: 0 %
BILIRUB SERPL-MCNC: 0.3 MG/DL
BUN SERPL-MCNC: 14.7 MG/DL (ref 6–20)
CALCIUM SERPL-MCNC: 9.5 MG/DL (ref 8.8–10.4)
CHLORIDE SERPL-SCNC: 102 MMOL/L (ref 98–107)
CHOLEST SERPL-MCNC: 203 MG/DL
CREAT SERPL-MCNC: 0.85 MG/DL (ref 0.51–1.17)
EGFRCR SERPLBLD CKD-EPI 2021: 85 ML/MIN/1.73M2
EOSINOPHIL # BLD AUTO: 0.4 10E3/UL (ref 0–0.7)
EOSINOPHIL NFR BLD AUTO: 4 %
ERYTHROCYTE [DISTWIDTH] IN BLOOD BY AUTOMATED COUNT: 12.8 % (ref 10–15)
EST. AVERAGE GLUCOSE BLD GHB EST-MCNC: 105 MG/DL
FASTING STATUS PATIENT QL REPORTED: YES
FASTING STATUS PATIENT QL REPORTED: YES
GLUCOSE SERPL-MCNC: 100 MG/DL (ref 70–99)
HBA1C MFR BLD: 5.3 % (ref 0–5.6)
HCO3 SERPL-SCNC: 26 MMOL/L (ref 22–29)
HCT VFR BLD AUTO: 43.2 % (ref 35–53)
HDLC SERPL-MCNC: 64 MG/DL
HGB BLD-MCNC: 14.4 G/DL (ref 11.7–17.7)
IMM GRANULOCYTES # BLD: 0.1 10E3/UL
IMM GRANULOCYTES NFR BLD: 1 %
LDLC SERPL CALC-MCNC: 108 MG/DL
LYMPHOCYTES # BLD AUTO: 3.7 10E3/UL (ref 0.8–5.3)
LYMPHOCYTES NFR BLD AUTO: 35 %
MCH RBC QN AUTO: 28.7 PG (ref 26.5–33)
MCHC RBC AUTO-ENTMCNC: 33.3 G/DL (ref 31.5–36.5)
MCV RBC AUTO: 86 FL (ref 78–100)
MONOCYTES # BLD AUTO: 0.9 10E3/UL (ref 0–1.3)
MONOCYTES NFR BLD AUTO: 9 %
NEUTROPHILS # BLD AUTO: 5.4 10E3/UL (ref 1.6–8.3)
NEUTROPHILS NFR BLD AUTO: 52 %
NONHDLC SERPL-MCNC: 139 MG/DL
PLATELET # BLD AUTO: 315 10E3/UL (ref 150–450)
POTASSIUM SERPL-SCNC: 4.7 MMOL/L (ref 3.4–5.3)
PROT SERPL-MCNC: 7.2 G/DL (ref 6.4–8.3)
RBC # BLD AUTO: 5.02 10E6/UL (ref 3.8–5.9)
SODIUM SERPL-SCNC: 139 MMOL/L (ref 135–145)
TRIGL SERPL-MCNC: 153 MG/DL
TSH SERPL DL<=0.005 MIU/L-ACNC: 2.77 UIU/ML (ref 0.3–4.2)
WBC # BLD AUTO: 10.4 10E3/UL (ref 4–11)

## 2025-01-20 PROCEDURE — 80061 LIPID PANEL: CPT | Performed by: NURSE PRACTITIONER

## 2025-01-20 PROCEDURE — G2211 COMPLEX E/M VISIT ADD ON: HCPCS | Performed by: NURSE PRACTITIONER

## 2025-01-20 PROCEDURE — 85025 COMPLETE CBC W/AUTO DIFF WBC: CPT | Performed by: NURSE PRACTITIONER

## 2025-01-20 PROCEDURE — 83036 HEMOGLOBIN GLYCOSYLATED A1C: CPT | Performed by: NURSE PRACTITIONER

## 2025-01-20 PROCEDURE — 99396 PREV VISIT EST AGE 40-64: CPT | Performed by: NURSE PRACTITIONER

## 2025-01-20 PROCEDURE — 99214 OFFICE O/P EST MOD 30 MIN: CPT | Mod: 25 | Performed by: NURSE PRACTITIONER

## 2025-01-20 PROCEDURE — 84443 ASSAY THYROID STIM HORMONE: CPT | Performed by: NURSE PRACTITIONER

## 2025-01-20 PROCEDURE — 80053 COMPREHEN METABOLIC PANEL: CPT | Performed by: NURSE PRACTITIONER

## 2025-01-20 PROCEDURE — 36415 COLL VENOUS BLD VENIPUNCTURE: CPT | Performed by: NURSE PRACTITIONER

## 2025-01-20 RX ORDER — ESTRADIOL 2 MG/1
4 TABLET ORAL 2 TIMES DAILY
Qty: 360 TABLET | Refills: 1 | Status: SHIPPED | OUTPATIENT
Start: 2025-01-20

## 2025-01-20 RX ORDER — ESCITALOPRAM OXALATE 20 MG/1
20 TABLET ORAL DAILY
Status: CANCELLED | OUTPATIENT
Start: 2025-01-20

## 2025-01-20 SDOH — HEALTH STABILITY: PHYSICAL HEALTH: ON AVERAGE, HOW MANY DAYS PER WEEK DO YOU ENGAGE IN MODERATE TO STRENUOUS EXERCISE (LIKE A BRISK WALK)?: 2 DAYS

## 2025-01-20 ASSESSMENT — PATIENT HEALTH QUESTIONNAIRE - PHQ9
SUM OF ALL RESPONSES TO PHQ QUESTIONS 1-9: 11
10. IF YOU CHECKED OFF ANY PROBLEMS, HOW DIFFICULT HAVE THESE PROBLEMS MADE IT FOR YOU TO DO YOUR WORK, TAKE CARE OF THINGS AT HOME, OR GET ALONG WITH OTHER PEOPLE: SOMEWHAT DIFFICULT
SUM OF ALL RESPONSES TO PHQ QUESTIONS 1-9: 11

## 2025-01-20 ASSESSMENT — SOCIAL DETERMINANTS OF HEALTH (SDOH): HOW OFTEN DO YOU GET TOGETHER WITH FRIENDS OR RELATIVES?: ONCE A WEEK

## 2025-01-20 ASSESSMENT — PAIN SCALES - GENERAL: PAINLEVEL_OUTOF10: NO PAIN (0)

## 2025-01-20 NOTE — PROGRESS NOTES
"Preventive Care Visit  [facility] JOHANNA Jay CNP, Internal Medicine - Pediatrics  Jan 20, 2025      Assessment & Plan     Routine general medical examination at a health care facility    - Lipid panel reflex to direct LDL Fasting; Future  - Lipid panel reflex to direct LDL Fasting    Gender dysphoria  Stable on current regime, no changes made  - estradiol (ESTRACE) 2 MG tablet; Take 2 tablets (4 mg) by mouth 2 times daily.    Male-to-female transgender person  stable  - estradiol (ESTRACE) 2 MG tablet; Take 2 tablets (4 mg) by mouth 2 times daily.    Recurrent major depressive disorder, in partial remission  Generally stable, drives for [employer], experiences transphobic customers.    Other fatigue  Likely rt diet and lack of eercise and potentially mood. Discussed role of suresh these things can affect fatigue. Will raw additional labs to ro other potential causes.   - CBC with platelets and differential; Future  - TSH with free T4 reflex; Future  - Comprehensive metabolic panel (BMP + Alb, Alk Phos, ALT, AST, Total. Bili, TP); Future  - Comprehensive metabolic panel (BMP + Alb, Alk Phos, ALT, AST, Total. Bili, TP)  - TSH with free T4 reflex  - CBC with platelets and differential    Class 3 severe obesity due to excess calories with serious comorbidity and body mass index (BMI) of 40.0 to 44.9 in adult (H)  Diet and exercise reviewed  - Hemoglobin A1c; Future  - Hemoglobin A1c    Weight gain  Per above        The longitudinal plan of care for the diagnosis(es)/condition(s) as documented were addressed during this visit. Due to the added complexity in care, I will continue to support Phoenix in the subsequent management and with ongoing continuity of care.    BMI  Estimated body mass index is 41.11 kg/m  as calculated from the following:    Height as of this encounter: 1.746 m (5' 8.75\").    Weight as of this encounter: 125.4 kg (276 lb 6.4 oz).       Counseling  Appropriate " preventive services were addressed with this patient via screening, questionnaire, or discussion as appropriate for fall prevention, nutrition, physical activity, Tobacco-use cessation, social engagement, weight loss and cognition.  Checklist reviewing preventive services available has been given to the patient.  Reviewed patient's diet, addressing concerns and/or questions.   She is at risk for lack of exercise and has been provided with information to increase physical activity for the benefit of her well-being.   She is at risk for psychosocial distress and has been provided with information to reduce risk.   The patient's PHQ-9 score is consistent with moderate depression. She was provided with information regarding depression.           The longitudinal plan of care for the diagnosis(es)/condition(s) as documented were addressed during this visit. Due to the added complexity in care, I will continue to support Phoenix in the subsequent management and with ongoing continuity of care.    Subjective Phoenix is a 49 year old, presenting for the following:  Physical        1/20/2025     8:08 AM   Additional Questions   Roomed by Scarlett Bonilla CMA          HPI  FASTING today    Is followed by psychiatry and therapist on lexapro. Feeling depressed rt inauguration.     Wt Readings from Last 4 Encounters:   01/20/25 125.4 kg (276 lb 6.4 oz)   08/19/24 116.1 kg (256 lb)   08/07/24 115.4 kg (254 lb 8 oz)   07/23/24 116.3 kg (256 lb 8 oz)       Health Care Directive  Patient does not have a Health Care Directive: Discussed advance care planning with patient; however, patient declined at this time.      1/20/2025   General Health   How would you rate your overall physical health? (!) FAIR   Feel stress (tense, anxious, or unable to sleep) Rather much   (!) STRESS CONCERN      1/20/2025   Nutrition   Three or more servings of calcium each day? (!) NO   Diet: Regular (no restrictions)   How many servings of fruit and  vegetables per day? (!) 0-1   How many sweetened beverages each day? 0-1         1/20/2025   Exercise   Days per week of moderate/strenous exercise 2 days   (!) EXERCISE CONCERN      1/20/2025   Social Factors   Frequency of gathering with friends or relatives Once a week   Worry food won't last until get money to buy more No   Food not last or not have enough money for food? No   Do you have housing? (Housing is defined as stable permanent housing and does not include staying ouside in a car, in a tent, in an abandoned building, in an overnight shelter, or couch-surfing.) Yes   Are you worried about losing your housing? No   Lack of transportation? No   Unable to get utilities (heat,electricity)? No         1/20/2025   Dental   Dentist two times every year? Yes         1/20/2025   TB Screening   Were you born outside of the [place]? No       Today's PHQ-9 Score:       1/20/2025     8:03 AM   PHQ-9 SCORE   PHQ-9 Total Score MyChart 11 (Moderate depression)   PHQ-9 Total Score 11        Patient-reported         1/20/2025   Substance Use   Alcohol more than 3/day or more than 7/wk No   Do you use any other substances recreationally? (!) CANNABIS PRODUCTS     Social History     Tobacco Use    Smoking status: Never     Passive exposure: Never    Smokeless tobacco: Never   Vaping Use    Vaping status: Never Used   Substance Use Topics    Alcohol use: Yes     Comment: 1 beer every 2 weeks    Drug use: Yes     Types: Marijuana     Comment: smoke                  1/20/2025   STI Screening   New sexual partner(s) since last STI/HIV test? No        ASCVD Risk   The 10-year ASCVD risk score (Edith BYRD, et al., 2019) is: 0.9%    Values used to calculate the score:      Age: 49 years      Sex: Female      Is Non- : No      Diabetic: No      Tobacco smoker: No      Systolic Blood Pressure: 122 mmHg      Is BP treated: No      HDL Cholesterol: 51 mg/dL      Total Cholesterol: 186 mg/dL        1/20/2025  "  Contraception/Family Planning   Questions about contraception or family planning No        Reviewed and updated as needed this visit by Provider     Meds                         Objective    Exam  /68 (BP Location: Right arm, Cuff Size: Adult Large)   Pulse 62   Temp 97.9  F (36.6  C) (Tympanic)   Resp 18   Ht 1.746 m (5' 8.75\")   Wt 125.4 kg (276 lb 6.4 oz)   SpO2 98%   BMI 41.11 kg/m     Estimated body mass index is 41.11 kg/m  as calculated from the following:    Height as of this encounter: 1.746 m (5' 8.75\").    Weight as of this encounter: 125.4 kg (276 lb 6.4 oz).    Physical Exam  GENERAL: alert and no distress  EYES: Eyes grossly normal to inspection, PERRL and conjunctivae and sclerae normal  HENT: ear canals and TM's normal, nose and mouth without ulcers or lesions  NECK: no adenopathy, no asymmetry, masses, or scars  RESP: lungs clear to auscultation - no rales, rhonchi or wheezes  CV: regular rate and rhythm, normal S1 S2, no S3 or S4, no murmur, click or rub, no peripheral edema  MS: no gross musculoskeletal defects noted, no edema        Signed Electronically by: JOHANNA Espinoza CNP    Answers submitted by the patient for this visit:  Patient Health Questionnaire (Submitted on 1/20/2025)  If you checked off any problems, how difficult have these problems made it for you to do your work, take care of things at home, or get along with other people?: Somewhat difficult  PHQ9 TOTAL SCORE: 11    "

## 2025-01-20 NOTE — PATIENT INSTRUCTIONS
Patient Education   Preventive Care Advice   This is general advice given by our system to help you stay healthy. However, your care team may have specific advice just for you. Please talk to your care team about your preventive care needs.  Nutrition  Eat 5 or more servings of fruits and vegetables each day.  Try wheat bread, brown rice and whole grain pasta (instead of white bread, rice, and pasta).  Get enough calcium and vitamin D. Check the label on foods and aim for 100% of the RDA (recommended daily allowance).  Lifestyle  Exercise at least 150 minutes each week  (30 minutes a day, 5 days a week).  Do muscle strengthening activities 2 days a week. These help control your weight and prevent disease.  No smoking.  Wear sunscreen to prevent skin cancer.  Have a dental exam and cleaning every 6 months.  Yearly exams  See your health care team every year to talk about:  Any changes in your health.  Any medicines your care team has prescribed.  Preventive care, family planning, and ways to prevent chronic diseases.  Shots (vaccines)   HPV shots (up to age 26), if you've never had them before.  Hepatitis B shots (up to age 59), if you've never had them before.  COVID-19 shot: Get this shot when it's due.  Flu shot: Get a flu shot every year.  Tetanus shot: Get a tetanus shot every 10 years.  Pneumococcal, hepatitis A, and RSV shots: Ask your care team if you need these based on your risk.  Shingles shot (for age 50 and up)  General health tests  Diabetes screening:  Starting at age 35, Get screened for diabetes at least every 3 years.  If you are younger than age 35, ask your care team if you should be screened for diabetes.  Cholesterol test: At age 39, start having a cholesterol test every 5 years, or more often if advised.  Bone density scan (DEXA): At age 50, ask your care team if you should have this scan for osteoporosis (brittle bones).  Hepatitis C: Get tested at least once in your life.  STIs (sexually  transmitted infections)  Before age 24: Ask your care team if you should be screened for STIs.  After age 24: Get screened for STIs if you're at risk. You are at risk for STIs (including HIV) if:  You are sexually active with more than one person.  You don't use condoms every time.  You or a partner was diagnosed with a sexually transmitted infection.  If you are at risk for HIV, ask about PrEP medicine to prevent HIV.  Get tested for HIV at least once in your life, whether you are at risk for HIV or not.  Cancer screening tests  Cervical cancer screening: If you have a cervix, begin getting regular cervical cancer screening tests starting at age 21.  Breast cancer scan (mammogram): If you've ever had breasts, begin having regular mammograms starting at age 40. This is a scan to check for breast cancer.  Colon cancer screening: It is important to start screening for colon cancer at age 45.  Have a colonoscopy test every 10 years (or more often if you're at risk) Or, ask your provider about stool tests like a FIT test every year or Cologuard test every 3 years.  To learn more about your testing options, visit:   .  For help making a decision, visit:   https://bit.ly/qn12482.  Prostate cancer screening test: If you have a prostate, ask your care team if a prostate cancer screening test (PSA) at age 55 is right for you.  Lung cancer screening: If you are a current or former smoker ages 50 to 80, ask your care team if ongoing lung cancer screenings are right for you.  For informational purposes only. Not to replace the advice of your health care provider. Copyright   2023 [facility]. All rights reserved. Clinically reviewed by the [facility]. [employer] [id number] - REV 01/24.  Learning About Stress  What is stress?     Stress is your body's response to a hard situation. Your body can have a physical, emotional, or mental response. Stress is a fact of life for most people, and it  affects everyone differently. What causes stress for you may not be stressful for someone else.  A lot of things can cause stress. You may feel stress when you go on a job interview, take a test, or run a race. This kind of short-term stress is normal and even useful. It can help you if you need to work hard or react quickly. For example, stress can help you finish an important job on time.  Long-term stress is caused by ongoing stressful situations or events. Examples of long-term stress include long-term health problems, ongoing problems at work, or conflicts in your family. Long-term stress can harm your health.  How does stress affect your health?  When you are stressed, your body responds as though you are in danger. It makes hormones that speed up your heart, make you breathe faster, and give you a burst of energy. This is called the fight-or-flight stress response. If the stress is over quickly, your body goes back to normal and no harm is done.  But if stress happens too often or lasts too long, it can have bad effects. Long-term stress can make you more likely to get sick, and it can make symptoms of some diseases worse. If you tense up when you are stressed, you may develop neck, shoulder, or low back pain. Stress is linked to high blood pressure and heart disease.  Stress also harms your emotional health. It can make you [place], tense, or depressed. Your relationships may suffer, and you may not do well at work or school.  What can you do to manage stress?  You can try these things to help manage stress:   Do something active. Exercise or activity can help reduce stress. Walking is a great way to get started. Even everyday activities such as housecleaning or yard work can help.  Try yoga or raquel chi. These techniques combine exercise and meditation. You may need some training at first to learn them.  Do something you enjoy. For example, listen to music or go to a movie. Practice your hobby or do volunteer  "work.  Meditate. This can help you relax, because you are not worrying about what happened before or what may happen in the future.  Do guided imagery. Imagine yourself in any setting that helps you feel calm. You can use online videos, books, or a teacher to guide you.  Do breathing exercises. For example:  From a standing position, bend forward from the waist with your knees slightly bent. Let your arms dangle close to the floor.  Breathe in slowly and deeply as you return to a standing position. Roll up slowly and lift your head last.  Hold your breath for just a few seconds in the standing position.  Breathe out slowly and bend forward from the waist.  Let your feelings out. Talk, laugh, cry, and express anger when you need to. Talking with supportive friends or family, a counselor, or a rodolfo leader about your feelings is a healthy way to relieve stress. Avoid discussing your feelings with people who make you feel worse.  Write. It may help to write about things that are bothering you. This helps you find out how much stress you feel and what is causing it. When you know this, you can find better ways to cope.  What can you do to prevent stress?  You might try some of these things to help prevent stress:  Manage your time. This helps you find time to do the things you want and need to do.  Get enough sleep. Your body recovers from the stresses of the day while you are sleeping.  Get support. Your family, friends, and community can make a difference in how you experience stress.  Limit your news feed. Avoid or limit time on social media or news that may make you feel stressed.  Do something active. Exercise or activity can help reduce stress. Walking is a great way to get started.  Where can you learn more?  Go to https://www.ClickEquations.net/patiented  Enter N032 in the search box to learn more about \"Learning About Stress.\"  Current as of: October 24, 2023  Content Version: 14.3    2024 [email or web address]. "   Care instructions adapted under license by your healthcare professional. If you have questions about a medical condition or this instruction, always ask your healthcare professional. [employer] disclaims any warranty or liability for your use of this information.    Learning About Depression Screening  What is depression screening?  Depression screening is a way to see if you have depression symptoms. It may be done by a doctor or counselor. It's often part of a routine checkup. That's because your mental health is just as important as your physical health.  Depression is a mental health condition that affects how you feel, think, and act. You may:  Have less energy.  Lose interest in your daily activities.  Feel sad and grouchy for a long time.  Depression is very common. It affects people of all ages.  Many things can lead to depression. Some people become depressed after they have a stroke or find out they have a major illness like cancer or heart disease. The death of a loved one or a breakup may lead to depression. It can run in families. Most experts believe that a combination of inherited genes and stressful life events can cause it.  What happens during screening?  You may be asked to fill out a form about your depression symptoms. You and the doctor will discuss your answers. The doctor may ask you more questions to learn more about how you think, act, and feel.  What happens after screening?  If you have symptoms of depression, your doctor will talk to you about your options.  Doctors usually treat depression with medicines or counseling. Often, combining the two works best. Many people don't get help because they think that they'll get over the depression on their own. But people with depression may not get better unless they get treatment.  The cause of depression is not well understood. There may be many factors involved. But if you have depression, it's not your fault.  A serious symptom  "of depression is thinking about death or suicide. If you or someone you care about talks about this or about feeling hopeless, get help right away.  It's important to know that depression can be treated. Medicine, counseling, and self-care may help.  Where can you learn more?  Go to https://www.Fonmatch.net/patiented  Enter T185 in the search box to learn more about \"Learning About Depression Screening.\"  Current as of: July 31, 2024  Content Version: 14.3    2024 [employer].   Care instructions adapted under license by your healthcare professional. If you have questions about a medical condition or this instruction, always ask your healthcare professional. [employer] disclaims any warranty or liability for your use of this information.    Substance Use Disorder: Care Instructions  Overview     You can improve your life and health by stopping your use of alcohol or drugs. When you don't drink or use drugs, you may feel and sleep better. You may get along better with your family, friends, and coworkers. There are medicines and programs that can help with substance use disorder.  How can you care for yourself at home?  Here are some ways to help you stay sober and prevent relapse.  If you have been given medicine to help keep you sober or reduce your cravings, be sure to take it exactly as prescribed.  Talk to your doctor about programs that can help you stop using drugs or drinking alcohol.  Do not keep alcohol or drugs in your home.  Plan ahead. Think about what you'll say if other people ask you to drink or use drugs. Try not to spend time with people who drink or use drugs.  Use the time and money spent on drinking or drugs to do something that's important to you.  Preventing a relapse  Have a plan to deal with relapse. Learn to recognize changes in your thinking that lead you to drink or use drugs. Get help before you start to drink or use drugs again.  Try to stay away from situations, " friends, or places that may lead you to drink or use drugs.  If you feel the need to drink alcohol or use drugs again, seek help right away. Call a trusted friend or family member. Some people get support from organizations such as Narcotics Anonymous or [employer] or from treatment facilities.  If you relapse, get help as soon as you can. Some people make a plan with another person that outlines what they want that person to do for them if they relapse. The plan usually includes how to handle the relapse and who to notify in case of relapse.  Don't give up. Remember that a relapse doesn't mean that you have failed. Use the experience to learn the triggers that lead you to drink or use drugs. Then quit again. Recovery is a lifelong process. Many people have several relapses before they are able to quit for good.  Follow-up care is a key part of your treatment and safety. Be sure to make and go to all appointments, and call your doctor if you are having problems. It's also a good idea to know your test results and keep a list of the medicines you take.  When should you call for help?   Call [phone]  anytime you think you may need emergency care. For example, call if you or someone else:    Has overdosed or has withdrawal signs. Be sure to tell the emergency workers that you are or someone else is using or trying to quit using drugs. Overdose or withdrawal signs may include:  Losing consciousness.  Seizure.  Seeing or hearing things that aren't there (hallucinations).     Is thinking or talking about suicide or harming others.   Where to get help 24 hours a day, 7 days a week   If you or someone you know talks about suicide, self-harm, a mental health crisis, a substance use crisis, or any other kind of emotional distress, get help right away. You can:    Call the Suicide and Crisis Lifeline at 697.     Call 9-382-196-TALK (1-218.210.6248).     Text HOME to 702410 to access the Crisis Text Line.   Consider saving  "these numbers in your phone.  Go to [employer] for more information or to chat online.  Call your doctor now or seek immediate medical care if:    You are having withdrawal symptoms. These may include nausea or vomiting, sweating, shakiness, and anxiety.   Watch closely for changes in your health, and be sure to contact your doctor if:    You have a relapse.     You need more help or support to stop.   Where can you learn more?  Go to https://www.[employer].net/patiented  Enter H573 in the search box to learn more about \"Substance Use Disorder: Care Instructions.\"  Current as of: November 15, 2023  Content Version: 14.3    2024 [employer].   Care instructions adapted under license by your healthcare professional. If you have questions about a medical condition or this instruction, always ask your healthcare professional. [employer] disclaims any warranty or liability for your use of this information.       "

## 2025-03-03 ENCOUNTER — TELEPHONE (OUTPATIENT)
Dept: PEDIATRICS | Facility: CLINIC | Age: 50
End: 2025-03-03

## 2025-03-03 NOTE — TELEPHONE ENCOUNTER
Order/Referral Request    Who is requesting: [place]    Orders being requested: neck PT    Reason service is needed/diagnosis: Old neck injury     When are orders needed by: asap    Has this been discussed with Provider: No    Does patient have a preference on a Group/Provider/Facility?  [facility]    Does patient have an appointment scheduled?: No    Where to send orders: Place orders within Epic    Could we send this information to you in [facility] or would you prefer to receive a phone call?:   No preference   Okay to leave a detailed message?: Yes at Cell number on file:    Telephone Information:   Mobile [phone]

## 2025-03-03 NOTE — TELEPHONE ENCOUNTER
E-visit needed for referral requests. RN called and informed patient. Send [employer] message with instructions.     Behzad VIZCARRA RN 3/3/2025 at 4:44 PM

## 2025-04-04 PROBLEM — M54.9 BACK PAIN, UNSPECIFIED BACK LOCATION, UNSPECIFIED BACK PAIN LATERALITY, UNSPECIFIED CHRONICITY: Status: ACTIVE | Noted: 2025-04-04

## 2025-07-31 DIAGNOSIS — Z78.9 MALE-TO-FEMALE TRANSGENDER PERSON: ICD-10-CM

## 2025-07-31 DIAGNOSIS — F64.9 GENDER DYSPHORIA: ICD-10-CM

## 2025-07-31 RX ORDER — ESTRADIOL 2 MG/1
4 TABLET ORAL 2 TIMES DAILY
Qty: 360 TABLET | Refills: 0 | Status: SHIPPED | OUTPATIENT
Start: 2025-07-31

## 2025-08-11 DIAGNOSIS — Z12.11 COLON CANCER SCREENING: ICD-10-CM

## (undated) DEVICE — SU MONOCRYL 3-0 PS-2 18" UND Y497G

## (undated) DEVICE — DRAPE POUCH IRR 1016

## (undated) DEVICE — GOWN IMPERVIOUS SPECIALTY XLG/XLONG 32474

## (undated) DEVICE — ESU LIGASURE DISSECTOR EXACT LF2019

## (undated) DEVICE — DRAIN JACKSON PRATT 10FR ROUND SU130-1321

## (undated) DEVICE — WIPES FOLEY CARE SURESTEP PROVON DFC100

## (undated) DEVICE — CATH FOLYSIL 16FR 15ML AA6116

## (undated) DEVICE — DRAPE UNDER BUTTOCK 8483

## (undated) DEVICE — CATH TRAY FOLEY SURESTEP 16FR WDRAIN BAG STLK LATEX A300316A

## (undated) DEVICE — SURGICEL POWDER ABSORBABLE HEMOSTAT 3GM 3013SP

## (undated) DEVICE — SOL NACL 0.9% IRRIG 1000ML BOTTLE 2F7124

## (undated) DEVICE — PAD FLOOR SURGSAFE 30X40" 83030

## (undated) DEVICE — SU PDS II 3-0 FS-1 27" CLR  Z442H

## (undated) DEVICE — TUBING SUCTION MEDI-VAC 1/4"X20' N620A

## (undated) DEVICE — DRAPE SPLIT SHEET 77X108 REINFORCED 29436

## (undated) DEVICE — SYR BULB IRRIG DOVER 60 ML LATEX FREE 67000

## (undated) DEVICE — SU SILK 0 SH 30" K834H

## (undated) DEVICE — SU PDS II 3-0 SH 27" Z316H

## (undated) DEVICE — SU ETHILON 3-0 PS-1 18" 1663H

## (undated) DEVICE — GLOVE BIOGEL PI MICRO SZ 7.5 48575

## (undated) DEVICE — PLUG CATH AND DRAIN TUBE PROTECTOR DYND12200

## (undated) DEVICE — DRAPE IOBAN INCISE 23X17" 6650EZ

## (undated) DEVICE — TRAY PREP DRY SKIN SCRUB 067

## (undated) DEVICE — NDL 25GA 1.5" 305838

## (undated) DEVICE — ESU GROUND PAD UNIVERSAL W/O CORD

## (undated) DEVICE — SPONGE LAP 18X18" X8435

## (undated) DEVICE — ESU CORD BIPOLAR GREEN 10-4000

## (undated) DEVICE — SOL WATER IRRIG 1000ML BOTTLE 2F7114

## (undated) DEVICE — DRAPE LEGGINGS CLEAR 8430

## (undated) DEVICE — STRAP KNEE/BODY 31143004

## (undated) DEVICE — Device

## (undated) DEVICE — LINEN TOWEL PACK X30 5481

## (undated) DEVICE — DRSG XEROFORM 5X9" 8884431605

## (undated) DEVICE — PREP POVIDONE-IODINE 7.5% SCRUB 4OZ BOTTLE MDS093945

## (undated) DEVICE — SU MONOCRYL 3-0 SH 27" UND Y416H

## (undated) DEVICE — SYR 10ML FINGER CONTROL W/O NDL 309695

## (undated) DEVICE — DRAPE POUCH INSTRUMENT 1018

## (undated) DEVICE — LINEN GOWN X4 5410

## (undated) DEVICE — LIGHT HANDLE X1 31140133

## (undated) DEVICE — LABEL MEDICATION SYSTEM 3303-P

## (undated) DEVICE — DRSG KERLIX 4 1/2"X4YDS ROLL 6730

## (undated) DEVICE — DRAIN JACKSON PRATT RESERVOIR 100ML SU130-1305

## (undated) DEVICE — BLADE KNIFE SURG 15 371115

## (undated) DEVICE — BLADE CLIPPER SGL USE 9680

## (undated) DEVICE — SU PROLENE 0 CT 30" 8434H

## (undated) DEVICE — PREP POVIDONE-IODINE 10% SOLUTION 4OZ BOTTLE MDS093944

## (undated) DEVICE — STPL SKIN 35W ROTATING HEAD PRW35

## (undated) DEVICE — SU VICRYL+ 3-0 27IN SH UND VCP416H

## (undated) RX ORDER — HYDROMORPHONE HYDROCHLORIDE 1 MG/ML
INJECTION, SOLUTION INTRAMUSCULAR; INTRAVENOUS; SUBCUTANEOUS
Status: DISPENSED
Start: 2024-07-08

## (undated) RX ORDER — HEPARIN SODIUM 5000 [USP'U]/.5ML
INJECTION, SOLUTION INTRAVENOUS; SUBCUTANEOUS
Status: DISPENSED
Start: 2024-07-08

## (undated) RX ORDER — ONDANSETRON 2 MG/ML
INJECTION INTRAMUSCULAR; INTRAVENOUS
Status: DISPENSED
Start: 2024-07-08

## (undated) RX ORDER — FENTANYL CITRATE 50 UG/ML
INJECTION, SOLUTION INTRAMUSCULAR; INTRAVENOUS
Status: DISPENSED
Start: 2024-07-08

## (undated) RX ORDER — ACETAMINOPHEN 325 MG/1
TABLET ORAL
Status: DISPENSED
Start: 2024-07-08

## (undated) RX ORDER — PROPOFOL 10 MG/ML
INJECTION, EMULSION INTRAVENOUS
Status: DISPENSED
Start: 2024-07-08

## (undated) RX ORDER — OXYCODONE HYDROCHLORIDE 5 MG/1
TABLET ORAL
Status: DISPENSED
Start: 2024-07-08

## (undated) RX ORDER — CEFAZOLIN SODIUM/WATER 2 G/20 ML
SYRINGE (ML) INTRAVENOUS
Status: DISPENSED
Start: 2024-07-08